# Patient Record
Sex: FEMALE | Race: WHITE | NOT HISPANIC OR LATINO | Employment: OTHER | URBAN - NONMETROPOLITAN AREA
[De-identification: names, ages, dates, MRNs, and addresses within clinical notes are randomized per-mention and may not be internally consistent; named-entity substitution may affect disease eponyms.]

---

## 2017-02-24 ENCOUNTER — OFFICE VISIT (OUTPATIENT)
Dept: CARDIOLOGY | Facility: CLINIC | Age: 72
End: 2017-02-24

## 2017-02-24 DIAGNOSIS — I42.0 DILATED CARDIOMYOPATHY (HCC): Primary | ICD-10-CM

## 2017-02-24 PROCEDURE — 93289 INTERROG DEVICE EVAL HEART: CPT | Performed by: INTERNAL MEDICINE

## 2017-06-23 ENCOUNTER — OFFICE VISIT (OUTPATIENT)
Dept: CARDIOLOGY | Facility: CLINIC | Age: 72
End: 2017-06-23

## 2017-06-23 VITALS
HEIGHT: 69 IN | DIASTOLIC BLOOD PRESSURE: 67 MMHG | HEART RATE: 77 BPM | OXYGEN SATURATION: 98 % | SYSTOLIC BLOOD PRESSURE: 94 MMHG | BODY MASS INDEX: 21.92 KG/M2 | WEIGHT: 148 LBS

## 2017-06-23 DIAGNOSIS — R06.02 SHORTNESS OF BREATH: ICD-10-CM

## 2017-06-23 DIAGNOSIS — I48.20 CHRONIC ATRIAL FIBRILLATION (HCC): ICD-10-CM

## 2017-06-23 DIAGNOSIS — I42.0 DILATED CARDIOMYOPATHY (HCC): Primary | ICD-10-CM

## 2017-06-23 PROCEDURE — 99213 OFFICE O/P EST LOW 20 MIN: CPT | Performed by: PHYSICIAN ASSISTANT

## 2017-06-23 PROCEDURE — 93289 INTERROG DEVICE EVAL HEART: CPT | Performed by: INTERNAL MEDICINE

## 2017-06-23 RX ORDER — RANITIDINE 150 MG/1
150 TABLET ORAL 2 TIMES DAILY
COMMUNITY
Start: 2017-06-13 | End: 2018-01-26 | Stop reason: ALTCHOICE

## 2017-06-23 NOTE — PROGRESS NOTES
Problem list     Subjective   Mag Wu is a 71 y.o. female     Chief Complaint   Patient presents with   • Follow-up     presents as a follow up   • Pacemaker Check   Problem List:      1. Minor nonobstructive disease by previous caths per patient's report, inadequate data.  2. Dilated cardiomyopathy status post AICD implantation.  3. Atrial fibrillation on Coumadin  4. Hyperlipidemia      5. Sjogren's Syndrome    HPI  The patient presents back today for routine evaluation.  Since her last appointment here, she has done well from cardiac standpoint.  Her main complaint today is of dizziness.  Description, this is mostly vertigo.  She does have mild orthostasis at times however.  This is very minimal.  The patient denies chest pain.  She has stable dyspnea.  She relates to no PND or orthopnea.  She denies symptoms of sustained dysrhythmias, although she does have brief rhythm disturbance at times.  This is nonproblematic.  The patient did have an ICD interrogation today which indicates stable device function.  She has no further complaints otherwise and feels that she is doing well.    Current Outpatient Prescriptions   Medication Sig Dispense Refill   • butalbital-acetaminophen-caffeine (FIORICET, ESGIC) -40 MG per tablet Take  by mouth 3 (Three) Times a Day.     • ESTRACE VAGINAL 0.1 MG/GM vaginal cream      • folic acid (FOLVITE) 400 MCG tablet Take  by mouth Daily.     • furosemide (LASIX) 40 MG tablet Take  by mouth.     • levothyroxine (SYNTHROID, LEVOTHROID) 25 MCG tablet Take  by mouth Daily.     • lisinopril (PRINIVIL,ZESTRIL) 5 MG tablet Take  by mouth.     • metoprolol tartrate (LOPRESSOR) 50 MG tablet Take  by mouth.     • Multiple Minerals-Vitamins (CALCIUM CITRATE +) tablet Take  by mouth.     • Multiple Vitamin (MULTIVITAMINS PO) Take  by mouth Daily.     • potassium chloride (MICRO-K) 10 MEQ CR capsule Take  by mouth.     • primidone (MYSOLINE) 50 MG tablet Take  by mouth.     •  "Probiotic Product (PROBIOTIC DAILY PO) Take  by mouth.     • raNITIdine (ZANTAC) 150 MG tablet Take 150 mg by mouth 2 (Two) Times a Day.     • rizatriptan (MAXALT) 10 MG tablet Take  by mouth.     • vitamin B-12 (CYANOCOBALAMIN) 1000 MCG tablet Take  by mouth 2 (Two) Times a Day.     • warfarin (COUMADIN) 10 MG tablet Take  by mouth Daily.       No current facility-administered medications for this visit.        Review of patient's allergies indicates no known allergies.    Past Medical History:   Diagnosis Date   • Atherosclerotic heart disease of native coronary artery without angina pectoris    • Atrial fibrillation 8/25/2016   • Dilated cardiomyopathy    • Hyperlipidemia 8/25/2016   • Hypothyroidism    • Sjogren's disease        Social History     Social History   • Marital status:      Spouse name: N/A   • Number of children: N/A   • Years of education: N/A     Occupational History   • Not on file.     Social History Main Topics   • Smoking status: Never Smoker   • Smokeless tobacco: Not on file   • Alcohol use No   • Drug use: No   • Sexual activity: Not on file     Other Topics Concern   • Not on file     Social History Narrative       Family History   Problem Relation Age of Onset   • Other Father      coronary arteriosclerosis   • Other Brother      cardiac pacemaker       Review of Systems   Constitutional: Positive for fatigue.   Eyes: Positive for visual disturbance.   Respiratory: Negative.    Cardiovascular: Negative.    Gastrointestinal: Negative.    Endocrine: Negative.    Genitourinary: Positive for difficulty urinating.   Musculoskeletal: Positive for arthralgias.   Skin: Negative.    Allergic/Immunologic: Negative.    Neurological: Positive for headaches.   Hematological: Negative.    Psychiatric/Behavioral: The patient is nervous/anxious.        Objective   BP 94/67 (BP Location: Left arm, Patient Position: Sitting)  Pulse 77  Ht 69\" (175.3 cm)  Wt 148 lb (67.1 kg)  SpO2 98%  BMI " 21.86 kg/m2  Lab Results (most recent)     None        Physical Exam   Constitutional: She is oriented to person, place, and time. She appears well-developed and well-nourished. No distress.   HENT:   Head: Normocephalic and atraumatic.   Eyes: Conjunctivae and EOM are normal. Pupils are equal, round, and reactive to light.   Neck: Normal range of motion. Neck supple. No JVD present. No tracheal deviation present.   Cardiovascular: Normal rate, regular rhythm, normal heart sounds and intact distal pulses.    Pulmonary/Chest: Effort normal and breath sounds normal.   Abdominal: Soft. Bowel sounds are normal. She exhibits no distension and no mass. There is no tenderness. There is no rebound and no guarding.   Musculoskeletal: Normal range of motion. She exhibits no edema, tenderness or deformity.   Neurological: She is alert and oriented to person, place, and time.   Skin: Skin is warm and dry. No rash noted. No erythema. No pallor.   Psychiatric: She has a normal mood and affect. Her behavior is normal. Judgment and thought content normal.   Nursing note and vitals reviewed.        Procedure   Procedures       Assessment/Plan      Diagnosis Plan   1. Dilated cardiomyopathy  Adult Transthoracic Echo Complete   2. Chronic atrial fibrillation  Adult Transthoracic Echo Complete   3. Shortness of breath  Adult Transthoracic Echo Complete     ICD interrogation today indicates stable function.  We will continue interrogations every 3 months to the clinic.  I will make no changes in cardiac related medications.  She has hypotensive today, but reports this is her normal and nonproblematic.  I will make no changes otherwise in that regard.  For any complications, she will call to us.  Otherwise, I would like to repeat an echocardiogram to reevaluate systolic function.  If stable, I will see her back in 6 months.

## 2017-09-22 ENCOUNTER — OFFICE VISIT (OUTPATIENT)
Dept: CARDIOLOGY | Facility: CLINIC | Age: 72
End: 2017-09-22

## 2017-09-22 DIAGNOSIS — I42.0 DILATED CARDIOMYOPATHY (HCC): Primary | ICD-10-CM

## 2017-09-22 PROCEDURE — 93289 INTERROG DEVICE EVAL HEART: CPT | Performed by: INTERNAL MEDICINE

## 2017-10-18 ENCOUNTER — TELEPHONE (OUTPATIENT)
Dept: CARDIOLOGY | Facility: CLINIC | Age: 72
End: 2017-10-18

## 2017-10-18 NOTE — TELEPHONE ENCOUNTER
Patient came by office regarding echo results that was done at [facility] in 06/2017. Patient wanted to know results ; echo form 06/2017 and the previous echo from 03/2016 was printed and reviewed with pt. Patient was notifieid echo from 06/2017 results were better than previous, slight regurgitation still shown but EF was improved. Patient was notified to continue current medication regimen keep all apts as scheduled. -; MARIANO

## 2018-01-26 ENCOUNTER — OFFICE VISIT (OUTPATIENT)
Dept: CARDIOLOGY | Facility: CLINIC | Age: 73
End: 2018-01-26

## 2018-01-26 VITALS
HEIGHT: 69 IN | WEIGHT: 149 LBS | OXYGEN SATURATION: 97 % | HEART RATE: 80 BPM | BODY MASS INDEX: 22.07 KG/M2 | SYSTOLIC BLOOD PRESSURE: 102 MMHG | DIASTOLIC BLOOD PRESSURE: 66 MMHG

## 2018-01-26 DIAGNOSIS — I48.20 CHRONIC ATRIAL FIBRILLATION (HCC): Primary | ICD-10-CM

## 2018-01-26 DIAGNOSIS — I42.0 DILATED CARDIOMYOPATHY (HCC): Primary | ICD-10-CM

## 2018-01-26 DIAGNOSIS — R06.02 SHORTNESS OF BREATH: ICD-10-CM

## 2018-01-26 DIAGNOSIS — I42.0 DILATED CARDIOMYOPATHY (HCC): ICD-10-CM

## 2018-01-26 PROCEDURE — 99213 OFFICE O/P EST LOW 20 MIN: CPT | Performed by: PHYSICIAN ASSISTANT

## 2018-01-26 PROCEDURE — 93289 INTERROG DEVICE EVAL HEART: CPT | Performed by: INTERNAL MEDICINE

## 2018-01-26 RX ORDER — OMEPRAZOLE 20 MG/1
20 CAPSULE, DELAYED RELEASE ORAL DAILY
COMMUNITY

## 2018-01-26 RX ORDER — NITROFURANTOIN MACROCRYSTALS 100 MG/1
100 CAPSULE ORAL DAILY
COMMUNITY
Start: 2018-01-25 | End: 2019-01-25

## 2018-01-26 NOTE — PROGRESS NOTES
Problem list     Subjective   Mag Wu is a 72 y.o. female     Chief Complaint   Patient presents with   • Follow-up     patient appears in office today for follow up with PPM check and testing results    • Cardiomyopathy     Problem List:      1. Minor nonobstructive disease by previous caths per patient's report, inadequate data.  2. Dilated cardiomyopathy status post AICD implantation.  3. Atrial fibrillation on Coumadin  4. Hyperlipidemia      5. Sjogren's Syndrome  HPI  The patient present back in today for routine evaluation and follow-up.  Since last appointment here, she has continued to do reasonably well.  She denies chest pain.  She has stable dyspnea.  She relates to no rhythm disturbance issues although interrogations suggests that she is in chronic A. fib.  The patient has no dizziness or syncope.  ICD interrogation today indicates stable function.  She did have an echo after last appointment to evaluate systolic function.  She has history of dilated cardiomyopathy.  Echocardiogram performed at [facility] suggested an EF of 55-60% with mild to moderate MR and AI.  Findings were unremarkable otherwise.  We did review this with the patient today.  She has no further complaints otherwise.    Current Outpatient Prescriptions   Medication Sig Dispense Refill   • butalbital-acetaminophen-caffeine (FIORICET, ESGIC) -40 MG per tablet Take 1 tablet by mouth 3 (Three) Times a Day.     • ESTRACE VAGINAL 0.1 MG/GM vaginal cream Insert 2 g into the vagina Daily.     • folic acid (FOLVITE) 400 MCG tablet Take 400 mcg by mouth Daily.     • levothyroxine (SYNTHROID, LEVOTHROID) 25 MCG tablet Take 25 mcg by mouth Daily.     • lisinopril (PRINIVIL,ZESTRIL) 5 MG tablet Take 5 mg by mouth Daily.     • metoprolol tartrate (LOPRESSOR) 50 MG tablet Take 50 mg by mouth Daily.     • Multiple Minerals-Vitamins (CALCIUM CITRATE +) tablet Take  by mouth.     • Multiple Vitamin (MULTIVITAMINS PO) Take   by mouth Daily.     • nitrofurantoin (MACRODANTIN) 100 MG capsule Take 100 mg by mouth Daily.     • omeprazole (priLOSEC) 20 MG capsule Take 20 mg by mouth Daily.     • primidone (MYSOLINE) 50 MG tablet Take 50 mg by mouth Every Night.     • Probiotic Product (PROBIOTIC DAILY PO) Take  by mouth.     • rizatriptan (MAXALT) 10 MG tablet Take 10 mg by mouth 1 (One) Time As Needed for Migraine.     • vitamin B-12 (CYANOCOBALAMIN) 1000 MCG tablet Take 1,000 mcg by mouth 2 (Two) Times a Day.     • warfarin (COUMADIN) 10 MG tablet Take 10 mg by mouth Daily.       No current facility-administered medications for this visit.        Review of patient's allergies indicates no known allergies.    Past Medical History:   Diagnosis Date   • Anemia    • Atherosclerotic heart disease of native coronary artery without angina pectoris    • Atrial fibrillation 8/25/2016   • Dilated cardiomyopathy    • Hyperlipidemia 8/25/2016   • Hypothyroidism    • Sjogren's disease        Social History     Social History   • Marital status:      Spouse name: N/A   • Number of children: N/A   • Years of education: N/A     Occupational History   • Not on file.     Social History Main Topics   • Smoking status: Never Smoker   • Smokeless tobacco: Never Used   • Alcohol use No   • Drug use: No   • Sexual activity: Defer     Other Topics Concern   • Not on file     Social History Narrative       Family History   Problem Relation Age of Onset   • Other Father      coronary arteriosclerosis   • Other Brother      cardiac pacemaker       Review of Systems   Constitutional: Negative.  Negative for fatigue.   HENT: Negative for congestion, rhinorrhea, sneezing and sore throat.    Eyes: Positive for visual disturbance (wears glasses daily).   Respiratory: Negative.  Negative for apnea, cough, chest tightness, shortness of breath (denies SOA) and wheezing.    Cardiovascular: Negative.  Negative for chest pain (denies CP), palpitations (denies  "palpitations) and leg swelling.   Gastrointestinal: Negative.  Negative for abdominal distention, abdominal pain, nausea and vomiting.   Endocrine: Negative.  Negative for cold intolerance, heat intolerance, polyphagia and polyuria.   Genitourinary: Negative.  Negative for difficulty urinating, frequency and urgency.   Musculoskeletal: Positive for arthralgias (spine/neck), back pain (due to arthritis) and neck pain (due to arthritis). Negative for myalgias and neck stiffness.   Skin: Negative.  Negative for rash and wound.   Allergic/Immunologic: Positive for food allergies (gluten intolerant). Negative for environmental allergies.   Neurological: Positive for headaches (occasional H/A; H/O migraines). Negative for dizziness, weakness and light-headedness.   Hematological: Bruises/bleeds easily (bruises and bleeds easily).   Psychiatric/Behavioral: Negative.  Negative for agitation, confusion and sleep disturbance (denies waking up smothering/SOA). The patient is not nervous/anxious.        Objective   Vitals:    01/26/18 1007   BP: 102/66   BP Location: Left arm   Patient Position: Sitting   Pulse: 80   SpO2: 97%   Weight: 67.6 kg (149 lb)   Height: 175.3 cm (69\")      /66 (BP Location: Left arm, Patient Position: Sitting)  Pulse 80  Ht 175.3 cm (69\")  Wt 67.6 kg (149 lb)  SpO2 97%  BMI 22 kg/m2   Lab Results (most recent)     None        Physical Exam   Constitutional: She is oriented to person, place, and time. She appears well-developed and well-nourished. No distress.   HENT:   Head: Normocephalic and atraumatic.   Eyes: Conjunctivae and EOM are normal. Pupils are equal, round, and reactive to light.   Neck: Normal range of motion. Neck supple. No JVD present. No tracheal deviation present.   Cardiovascular: Normal rate and intact distal pulses.  An irregularly irregular rhythm present.   Murmur heard.   Systolic (LLSB) murmur is present with a grade of 1/6   Pulmonary/Chest: Effort normal and " breath sounds normal.   Abdominal: Soft. Bowel sounds are normal. She exhibits no distension and no mass. There is no tenderness. There is no rebound and no guarding.   Musculoskeletal: Normal range of motion. She exhibits no edema, tenderness or deformity.   Neurological: She is alert and oriented to person, place, and time.   Skin: Skin is warm and dry. No rash noted. No erythema. No pallor.   Psychiatric: She has a normal mood and affect. Her behavior is normal. Judgment and thought content normal.   Nursing note and vitals reviewed.        Procedure     ECG 12 Lead  Date/Time: 1/26/2018 10:14 AM  Performed by: VANNESA BARTLETT  Authorized by: VANNESA BARTLETT   Comments: Atrial fib    Ventricular pacing without acute changes noted.               Assessment/Plan      Diagnosis Plan   1. Chronic atrial fibrillation  ECG 12 Lead   2. Dilated cardiomyopathy     3. Shortness of breath         The patient is stable from cardiac standpoint.  ICD interrogations today suggest normal device function.  We did review results of echocardiogram which suggests normal systolic function now and mild to moderate MR and AI.  Findings were felt stable to improved.  I feel nothing further is indicated.  I will continue medications.  For any complications, she will called us.  Otherwise, we will see her back in 3 months for repeat interrogation.  We will see her formally in 6 months.               Electronically signed by:

## 2018-04-27 ENCOUNTER — OFFICE VISIT (OUTPATIENT)
Dept: CARDIOLOGY | Facility: CLINIC | Age: 73
End: 2018-04-27

## 2018-04-27 DIAGNOSIS — I42.0 DILATED CARDIOMYOPATHY (HCC): Primary | ICD-10-CM

## 2018-04-27 PROCEDURE — 93289 INTERROG DEVICE EVAL HEART: CPT | Performed by: INTERNAL MEDICINE

## 2018-07-27 ENCOUNTER — OFFICE VISIT (OUTPATIENT)
Dept: CARDIOLOGY | Facility: CLINIC | Age: 73
End: 2018-07-27

## 2018-07-27 VITALS
HEART RATE: 77 BPM | SYSTOLIC BLOOD PRESSURE: 106 MMHG | OXYGEN SATURATION: 98 % | DIASTOLIC BLOOD PRESSURE: 64 MMHG | HEIGHT: 69 IN | WEIGHT: 146.8 LBS | BODY MASS INDEX: 21.74 KG/M2

## 2018-07-27 DIAGNOSIS — I42.0 DILATED CARDIOMYOPATHY (HCC): Primary | ICD-10-CM

## 2018-07-27 DIAGNOSIS — I48.20 CHRONIC ATRIAL FIBRILLATION (HCC): Primary | ICD-10-CM

## 2018-07-27 DIAGNOSIS — I42.0 DILATED CARDIOMYOPATHY (HCC): ICD-10-CM

## 2018-07-27 DIAGNOSIS — R06.02 SHORTNESS OF BREATH: ICD-10-CM

## 2018-07-27 PROCEDURE — 99213 OFFICE O/P EST LOW 20 MIN: CPT | Performed by: PHYSICIAN ASSISTANT

## 2018-07-27 PROCEDURE — 93289 INTERROG DEVICE EVAL HEART: CPT | Performed by: INTERNAL MEDICINE

## 2018-07-27 RX ORDER — SUMATRIPTAN 50 MG/1
50 TABLET, FILM COATED ORAL AS NEEDED
COMMUNITY
Start: 2018-06-21

## 2018-07-27 NOTE — PROGRESS NOTES
Problem list     Subjective   Mag Wu is a 72 y.o. female     Chief Complaint   Patient presents with   • Follow-up     patient appears in office today for 6 month follow up    • Atrial Fibrillation   Problem List:      1. Minor nonobstructive disease by previous caths per patient's report, inadequate data.  2. Dilated cardiomyopathy status post AICD implantation.  3. Atrial fibrillation on Coumadin  4. Hyperlipidemia      5. Sjogren's Syndrome    HPI  The patient presents in today for routine evaluation and follow-up.  She did have an ICD interrogation today which suggests stable function.  The patient is done well since her last appointment.  She relates to stable dyspnea.  She has no chest pain.  She denies PND or orthopnea.  Exercise capacity is remained stable.  Blood pressures have been normal as well.  She is tolerating current medications without complication.  She has routine labs to check PTT/INR is.  She reports those as therapeutic as of recent.  She has no bleeding complications with Coumadin.  She has no further complaints otherwise at this time.    Current Outpatient Prescriptions   Medication Sig Dispense Refill   • butalbital-acetaminophen-caffeine (FIORICET, ESGIC) -40 MG per tablet Take 1 tablet by mouth 3 (Three) Times a Day.     • ESTRACE VAGINAL 0.1 MG/GM vaginal cream Insert 2 g into the vagina Daily.     • folic acid (FOLVITE) 400 MCG tablet Take 400 mcg by mouth Daily.     • levothyroxine (SYNTHROID, LEVOTHROID) 25 MCG tablet Take 25 mcg by mouth Daily.     • lisinopril (PRINIVIL,ZESTRIL) 5 MG tablet Take 5 mg by mouth Daily.     • metoprolol tartrate (LOPRESSOR) 50 MG tablet Take 50 mg by mouth Daily.     • Multiple Minerals-Vitamins (CALCIUM CITRATE +) tablet Take  by mouth.     • Multiple Vitamin (MULTIVITAMINS PO) Take  by mouth Daily.     • nitrofurantoin (MACRODANTIN) 100 MG capsule Take 100 mg by mouth Daily.     • omeprazole (priLOSEC) 20 MG capsule Take 20 mg by  mouth Daily.     • primidone (MYSOLINE) 50 MG tablet Take 50 mg by mouth Every Night.     • Probiotic Product (PROBIOTIC DAILY PO) Take  by mouth.     • SUMAtriptan (IMITREX) 50 MG tablet Take 50 mg by mouth As Needed.     • vitamin B-12 (CYANOCOBALAMIN) 1000 MCG tablet Take 1,000 mcg by mouth 2 (Two) Times a Day.     • warfarin (COUMADIN) 10 MG tablet Take 10 mg by mouth Daily.       No current facility-administered medications for this visit.        Patient has no known allergies.    Past Medical History:   Diagnosis Date   • Anemia    • Atherosclerotic heart disease of native coronary artery without angina pectoris    • Atrial fibrillation (CMS/HCC) 8/25/2016   • Dilated cardiomyopathy (CMS/HCC)    • Hyperlipidemia 8/25/2016   • Hypothyroidism    • Sjogren's disease (CMS/HCC)        Social History     Social History   • Marital status:      Spouse name: N/A   • Number of children: N/A   • Years of education: N/A     Occupational History   • Not on file.     Social History Main Topics   • Smoking status: Never Smoker   • Smokeless tobacco: Never Used   • Alcohol use No   • Drug use: No   • Sexual activity: Defer     Other Topics Concern   • Not on file     Social History Narrative   • No narrative on file       Family History   Problem Relation Age of Onset   • Other Father         coronary arteriosclerosis   • Other Brother         cardiac pacemaker       Review of Systems   Constitutional: Negative.  Negative for fatigue.   HENT: Negative for congestion, rhinorrhea, sneezing and sore throat.    Eyes: Positive for visual disturbance (wears glasses daily).   Respiratory: Positive for shortness of breath (SOA with exertion ). Negative for apnea, cough, chest tightness and wheezing.    Cardiovascular: Positive for palpitations (occasional palpitations). Negative for chest pain (denies CP) and leg swelling.   Gastrointestinal: Negative.  Negative for abdominal distention, abdominal pain, nausea and vomiting.  "  Endocrine: Negative.  Negative for cold intolerance, heat intolerance and polyphagia.   Genitourinary: Negative.  Negative for difficulty urinating, frequency and urgency.   Musculoskeletal: Positive for arthralgias (joints) and back pain (back pain from spurs on spine). Negative for myalgias, neck pain and neck stiffness.   Skin: Negative.  Negative for rash and wound.   Allergic/Immunologic: Positive for food allergies (gluten intolerance). Negative for environmental allergies.   Neurological: Positive for headaches (frequent H/A's). Negative for dizziness, syncope, weakness and light-headedness.   Hematological: Bruises/bleeds easily (bruises and bleeds easily).   Psychiatric/Behavioral: Negative for agitation, confusion and sleep disturbance (denies waking up smothering/SOA). The patient is nervous/anxious (easily nervous/anxious).        Objective   Vitals:    07/27/18 0909   BP: 106/64   BP Location: Left arm   Patient Position: Sitting   Pulse: 77   SpO2: 98%   Weight: 66.6 kg (146 lb 12.8 oz)   Height: 175.3 cm (69\")      /64 (BP Location: Left arm, Patient Position: Sitting)   Pulse 77   Ht 175.3 cm (69\")   Wt 66.6 kg (146 lb 12.8 oz)   SpO2 98%   BMI 21.68 kg/m²    Lab Results (most recent)     None        Physical Exam   Constitutional: She is oriented to person, place, and time. She appears well-developed and well-nourished. No distress.   HENT:   Head: Normocephalic and atraumatic.   Eyes: Pupils are equal, round, and reactive to light. Conjunctivae and EOM are normal.   Neck: Normal range of motion. Neck supple. No JVD present. No tracheal deviation present.   Cardiovascular: Normal rate and intact distal pulses.  An irregularly irregular rhythm present.   Murmur heard.   Systolic (LLSB) murmur is present with a grade of 1/6   Pulmonary/Chest: Effort normal and breath sounds normal.   Abdominal: Soft. Bowel sounds are normal. She exhibits no distension and no mass. There is no tenderness. " There is no rebound and no guarding.   Musculoskeletal: Normal range of motion. She exhibits no edema, tenderness or deformity.   Neurological: She is alert and oriented to person, place, and time.   Skin: Skin is warm and dry. No rash noted. No erythema. No pallor.   Psychiatric: She has a normal mood and affect. Her behavior is normal. Judgment and thought content normal.   Nursing note and vitals reviewed.        Procedure     ECG 12 Lead  Date/Time: 7/27/2018 9:12 AM  Performed by: VANNESA BARTLETT  Authorized by: VANNESA BARTLETT   Comments: Atrial fib    Ventricular pacing without acute changes noted.               Assessment/Plan      Diagnosis Plan   1. Chronic atrial fibrillation (CMS/HCC)  ECG 12 Lead   2. Shortness of breath     3. Dilated cardiomyopathy (CMS/HCC)       ICD interrogations suggests normal function.  We will repeat that in 3 months.  The patient is on appropriate medications for which I will not change.  She is doing well from cardiovascular standpoint at this time.  We can see her back in 6 months through the clinic formally.  She will call for any complications prior to that.           Patient's Body mass index is 21.68 kg/m². BMI is within normal parameters. No follow-up required.             Electronically signed by:

## 2018-10-26 ENCOUNTER — OFFICE VISIT (OUTPATIENT)
Dept: CARDIOLOGY | Facility: CLINIC | Age: 73
End: 2018-10-26

## 2018-10-26 DIAGNOSIS — I42.0 DILATED CARDIOMYOPATHY (HCC): Primary | ICD-10-CM

## 2018-10-26 PROCEDURE — 93289 INTERROG DEVICE EVAL HEART: CPT | Performed by: INTERNAL MEDICINE

## 2019-01-25 ENCOUNTER — OFFICE VISIT (OUTPATIENT)
Dept: CARDIOLOGY | Facility: CLINIC | Age: 74
End: 2019-01-25

## 2019-01-25 VITALS
HEART RATE: 82 BPM | BODY MASS INDEX: 21.92 KG/M2 | OXYGEN SATURATION: 98 % | DIASTOLIC BLOOD PRESSURE: 63 MMHG | WEIGHT: 148 LBS | SYSTOLIC BLOOD PRESSURE: 102 MMHG | HEIGHT: 69 IN

## 2019-01-25 DIAGNOSIS — R06.02 SHORTNESS OF BREATH: ICD-10-CM

## 2019-01-25 DIAGNOSIS — I25.10 CORONARY ARTERY DISEASE DUE TO CALCIFIED CORONARY LESION: Primary | ICD-10-CM

## 2019-01-25 DIAGNOSIS — I25.84 CORONARY ARTERY DISEASE DUE TO CALCIFIED CORONARY LESION: Primary | ICD-10-CM

## 2019-01-25 DIAGNOSIS — I48.91 ATRIAL FIBRILLATION, UNSPECIFIED TYPE (HCC): ICD-10-CM

## 2019-01-25 DIAGNOSIS — I42.0 DILATED CARDIOMYOPATHY (HCC): Primary | ICD-10-CM

## 2019-01-25 PROCEDURE — 99213 OFFICE O/P EST LOW 20 MIN: CPT | Performed by: PHYSICIAN ASSISTANT

## 2019-01-25 PROCEDURE — 93000 ELECTROCARDIOGRAM COMPLETE: CPT | Performed by: PHYSICIAN ASSISTANT

## 2019-01-25 PROCEDURE — 93289 INTERROG DEVICE EVAL HEART: CPT | Performed by: INTERNAL MEDICINE

## 2019-01-25 NOTE — PROGRESS NOTES
Problem list     Subjective   Mag Wu is a 73 y.o. female     Chief Complaint   Patient presents with   • Coronary Artery Disease     Here for 6 mo. f/u   • Atrial Fibrillation   • Cardiomyopathy   • Hyperlipidemia   • Hypothyroidism   Problem List:      1. Minor nonobstructive disease by previous caths per patient's report, inadequate data.  2. Dilated cardiomyopathy status post AICD implantation.  3. Atrial fibrillation on Coumadin  4. Hyperlipidemia      5. Sjogren's Syndrome       HPI  The patient presents in today for routine evaluation and follow-up.  Since last evaluation here, the patient is continued to do fairly well from cardiovascular standpoint.  She denies any current chest discomfort.  She has stable dyspnea.  She reports intermittent episodes of palpitations and tachycardia which she recognizes Manzanares's A. fib.  These are very brief and nonproblematic.  These occur very infrequently.  She has no failure symptoms.  She denies claudication or vascular disease signs/symptoms otherwise.  She does have largely normal blood pressures when checked at home.  Of note, she did have a device interrogation today which indicated normal function of her bi-V ICD.    Current Outpatient Medications   Medication Sig Dispense Refill   • butalbital-acetaminophen-caffeine (FIORICET, ESGIC) -40 MG per tablet Take 1 tablet by mouth 3 (Three) Times a Day.     • ESTRACE VAGINAL 0.1 MG/GM vaginal cream Insert 2 g into the vagina Daily.     • folic acid (FOLVITE) 400 MCG tablet Take 400 mcg by mouth Daily.     • levothyroxine (SYNTHROID, LEVOTHROID) 25 MCG tablet Take 25 mcg by mouth Daily.     • lisinopril (PRINIVIL,ZESTRIL) 5 MG tablet Take 5 mg by mouth Daily.     • metoprolol tartrate (LOPRESSOR) 50 MG tablet Take 50 mg by mouth Daily.     • Multiple Minerals-Vitamins (CALCIUM CITRATE +) tablet Take  by mouth.     • Multiple Vitamin (MULTIVITAMINS PO) Take  by mouth Daily.     • omeprazole (priLOSEC) 20 MG  capsule Take 20 mg by mouth Daily.     • primidone (MYSOLINE) 50 MG tablet Take 50 mg by mouth Every Night.     • Probiotic Product (PROBIOTIC DAILY PO) Take  by mouth.     • SUMAtriptan (IMITREX) 50 MG tablet Take 50 mg by mouth As Needed.     • vitamin B-12 (CYANOCOBALAMIN) 1000 MCG tablet Take 1,000 mcg by mouth 2 (Two) Times a Day.     • warfarin (COUMADIN) 10 MG tablet Take 10 mg by mouth Daily.       No current facility-administered medications for this visit.        Patient has no known allergies.    Past Medical History:   Diagnosis Date   • Anemia    • Atherosclerotic heart disease of native coronary artery without angina pectoris    • Atrial fibrillation (CMS/HCC) 8/25/2016   • Dilated cardiomyopathy (CMS/HCC)    • Hyperlipidemia 8/25/2016   • Hypothyroidism    • Sjogren's disease (CMS/[facility])        Social History     Socioeconomic History   • Marital status:      Spouse name: Not on file   • Number of children: Not on file   • Years of education: Not on file   • Highest education level: Not on file   Social Needs   • Financial resource strain: Not on file   • Food insecurity - worry: Not on file   • Food insecurity - inability: Not on file   • Transportation needs - medical: Not on file   • Transportation needs - non-medical: Not on file   Occupational History   • Not on file   Tobacco Use   • Smoking status: Never Smoker   • Smokeless tobacco: Never Used   Substance and Sexual Activity   • Alcohol use: No   • Drug use: No   • Sexual activity: Defer   Other Topics Concern   • Not on file   Social History Narrative   • Not on file       Family History   Problem Relation Age of Onset   • Other Father         coronary arteriosclerosis   • Other Brother         cardiac pacemaker       Review of Systems   Constitutional: Negative.    HENT: Positive for nosebleeds (winter months).         Sinus issues   Eyes: Positive for visual disturbance (wears glasses).   Respiratory: Positive for shortness of breath  "(mildly).    Cardiovascular: Positive for palpitations (occas. HX a-fib). Negative for chest pain and leg swelling.   Gastrointestinal: Negative.    Endocrine: Negative.    Genitourinary: Negative.    Musculoskeletal: Positive for arthralgias and myalgias.   Skin: Negative.    Allergic/Immunologic: Positive for food allergies (gluten).   Neurological:        Denies stroke like sx's   Hematological: Bruises/bleeds easily.   Psychiatric/Behavioral: Negative.        Objective   Vitals:    01/25/19 0850   BP: 102/63   BP Location: Left arm   Patient Position: Sitting   Cuff Size: Adult   Pulse: 82   SpO2: 98%   Weight: 67.1 kg (148 lb)   Height: 175.3 cm (69.02\")      /63 (BP Location: Left arm, Patient Position: Sitting, Cuff Size: Adult)   Pulse 82   Ht 175.3 cm (69.02\")   Wt 67.1 kg (148 lb)   SpO2 98%   BMI 21.85 kg/m²    Lab Results (most recent)     None        Physical Exam   Constitutional: She is oriented to person, place, and time. She appears well-developed and well-nourished. No distress.   HENT:   Head: Normocephalic and atraumatic.   Eyes: Conjunctivae and EOM are normal. Pupils are equal, round, and reactive to light.   Neck: Normal range of motion. Neck supple. No JVD present. No tracheal deviation present.   Cardiovascular: Normal rate and intact distal pulses. An irregularly irregular rhythm present.   Murmur heard.   Systolic (LLSB) murmur is present with a grade of 1/6.  Pulmonary/Chest: Effort normal and breath sounds normal.   Abdominal: Soft. Bowel sounds are normal. She exhibits no distension and no mass. There is no tenderness. There is no rebound and no guarding.   Musculoskeletal: Normal range of motion. She exhibits no edema, tenderness or deformity.   Neurological: She is alert and oriented to person, place, and time.   Skin: Skin is warm and dry. No rash noted. No erythema. No pallor.   Psychiatric: She has a normal mood and affect. Her behavior is normal. Judgment and thought " content normal.   Nursing note and vitals reviewed.        Procedure     ECG 12 Lead  Date/Time: 1/25/2019 9:10 AM  Performed by: Dong Barr PA  Authorized by: Dong Barr PA   Comments: Ventricular pacing without acute changes noted.               Assessment/Plan      Diagnosis Plan   1. Coronary artery disease due to calcified coronary lesion  ECG 12 Lead    Adult Transthoracic Echo Complete W/ Cont if Necessary Per Protocol   2. Atrial fibrillation, unspecified type (CMS/HCC)  ECG 12 Lead    Adult Transthoracic Echo Complete W/ Cont if Necessary Per Protocol   3. Shortness of breath  Adult Transthoracic Echo Complete W/ Cont if Necessary Per Protocol     The patient is stable at this time from cardiovascular standpoint.  I would like to repeat an echo just to evaluate systolic function given history of dilated cardiomyopathy.  Her last echo from 2017 suggested preserved systolic function, of note.  Otherwise, I will make no changes in medications.  She is scheduled for laboratory evaluation today with her primary care provider.  I have asked that she for a copy of her labs to the clinic.  If her echo findings are stable, we will continue ICD checks every 3 months through the clinic and routine follow-ups every 6 months.                Patient's Body mass index is 21.85 kg/m². BMI is within normal parameters. No follow-up required..             Electronically signed by:

## 2019-04-26 ENCOUNTER — OFFICE VISIT (OUTPATIENT)
Dept: CARDIOLOGY | Facility: CLINIC | Age: 74
End: 2019-04-26

## 2019-04-26 DIAGNOSIS — I42.0 DILATED CARDIOMYOPATHY (HCC): Primary | ICD-10-CM

## 2019-04-26 PROCEDURE — 93289 INTERROG DEVICE EVAL HEART: CPT | Performed by: INTERNAL MEDICINE

## 2019-05-06 PROCEDURE — 93306 TTE W/DOPPLER COMPLETE: CPT | Performed by: INTERNAL MEDICINE

## 2019-05-08 ENCOUNTER — OUTSIDE FACILITY SERVICE (OUTPATIENT)
Dept: CARDIOLOGY | Facility: CLINIC | Age: 74
End: 2019-05-08

## 2019-07-26 ENCOUNTER — OFFICE VISIT (OUTPATIENT)
Dept: CARDIOLOGY | Facility: CLINIC | Age: 74
End: 2019-07-26

## 2019-07-26 VITALS
OXYGEN SATURATION: 97 % | SYSTOLIC BLOOD PRESSURE: 121 MMHG | HEIGHT: 69 IN | BODY MASS INDEX: 21.66 KG/M2 | HEART RATE: 70 BPM | DIASTOLIC BLOOD PRESSURE: 75 MMHG | WEIGHT: 146.2 LBS

## 2019-07-26 DIAGNOSIS — Z95.810 ICD (IMPLANTABLE CARDIOVERTER-DEFIBRILLATOR) IN PLACE: ICD-10-CM

## 2019-07-26 DIAGNOSIS — I42.0 DILATED CARDIOMYOPATHY (HCC): Primary | ICD-10-CM

## 2019-07-26 DIAGNOSIS — R06.02 SHORTNESS OF BREATH: ICD-10-CM

## 2019-07-26 DIAGNOSIS — I48.91 ATRIAL FIBRILLATION, UNSPECIFIED TYPE (HCC): ICD-10-CM

## 2019-07-26 PROCEDURE — 93283 PRGRMG EVAL IMPLANTABLE DFB: CPT | Performed by: INTERNAL MEDICINE

## 2019-07-26 PROCEDURE — 99213 OFFICE O/P EST LOW 20 MIN: CPT | Performed by: PHYSICIAN ASSISTANT

## 2019-07-26 NOTE — PROGRESS NOTES
Problem list     Subjective   Mag Wu is a 73 y.o. female     Chief Complaint   Patient presents with   • Atrial Fibrillation   • Coronary Artery Disease   Problem List:      1. Minor nonobstructive disease by previous caths per patient's report, inadequate data.  2. Dilated cardiomyopathy status post AICD implantation.  3. Atrial fibrillation on Coumadin  4. Hyperlipidemia      5. Sjogren's Syndrome       HPI  The patient presents in today for routine evaluation, follow-up, and ICD interrogation.  Since last evaluation the patient is continued to do well.  She has had recurrent episodes of hypotension subjectively at home.  Irregardless of hypertension, the patient reports that she has no weakness, dizziness, and certainly no syncope.  She has no symptoms or issues with her hypotension.  She has done very well as a matter fact on her current cardioprotective medications.  We reviewed that we would not be changing that less she developed issues with hypotension.  She reports no chest pain.  She has stable dyspnea.  She has no PND orthopnea.  She had a normal device check today with appropriate function of her ICD.    Current Outpatient Medications   Medication Sig Dispense Refill   • butalbital-acetaminophen-caffeine (FIORICET, ESGIC) -40 MG per tablet Take 1 tablet by mouth 3 (Three) Times a Day.     • ESTRACE VAGINAL 0.1 MG/GM vaginal cream Insert 2 g into the vagina Daily.     • folic acid (FOLVITE) 400 MCG tablet Take 400 mcg by mouth Daily.     • levothyroxine (SYNTHROID, LEVOTHROID) 25 MCG tablet Take 25 mcg by mouth Daily.     • lisinopril (PRINIVIL,ZESTRIL) 5 MG tablet Take 5 mg by mouth Daily.     • metoprolol tartrate (LOPRESSOR) 50 MG tablet Take 50 mg by mouth Daily.     • Multiple Minerals-Vitamins (CALCIUM CITRATE +) tablet Take  by mouth.     • Multiple Vitamin (MULTIVITAMINS PO) Take  by mouth Daily.     • omeprazole (priLOSEC) 20 MG capsule Take 20 mg by mouth Daily.     • primidone  (MYSOLINE) 50 MG tablet Take 50 mg by mouth Every Night.     • Probiotic Product (PROBIOTIC DAILY PO) Take  by mouth.     • SUMAtriptan (IMITREX) 50 MG tablet Take 50 mg by mouth As Needed.     • vitamin B-12 (CYANOCOBALAMIN) 1000 MCG tablet Take 1,000 mcg by mouth 2 (Two) Times a Day.     • warfarin (COUMADIN) 10 MG tablet Take 10 mg by mouth Daily.       No current facility-administered medications for this visit.        Patient has no known allergies.    Past Medical History:   Diagnosis Date   • Anemia    • Atherosclerotic heart disease of native coronary artery without angina pectoris    • Atrial fibrillation (CMS/HCC) 8/25/2016   • Dilated cardiomyopathy (CMS/HCC)    • Hyperlipidemia 8/25/2016   • Hypothyroidism    • Sjogren's disease (CMS/HCC)        Social History     Socioeconomic History   • Marital status:      Spouse name: Not on file   • Number of children: Not on file   • Years of education: Not on file   • Highest education level: Not on file   Tobacco Use   • Smoking status: Never Smoker   • Smokeless tobacco: Never Used   Substance and Sexual Activity   • Alcohol use: No   • Drug use: No   • Sexual activity: Defer       Family History   Problem Relation Age of Onset   • Other Father         coronary arteriosclerosis   • Other Brother         cardiac pacemaker       Review of Systems   Constitutional: Negative.  Negative for fatigue.   HENT: Negative.  Negative for congestion, rhinorrhea and sneezing.    Eyes: Positive for visual disturbance (wears glasses daily).   Respiratory: Positive for chest tightness (occasional chest tightness ) and shortness of breath (SOA on exertion only). Negative for cough and wheezing.    Cardiovascular: Positive for palpitations (occasional palpitations/current flutters after interrigation). Negative for chest pain and leg swelling.   Gastrointestinal: Negative.  Negative for abdominal pain, nausea and vomiting.   Endocrine: Negative.  Negative for cold  "intolerance and heat intolerance.   Genitourinary: Negative.  Negative for difficulty urinating, frequency and urgency.   Musculoskeletal: Negative.  Negative for arthralgias, back pain, myalgias, neck pain and neck stiffness.   Skin: Negative.  Negative for rash and wound.   Allergic/Immunologic: Negative.  Negative for environmental allergies and food allergies.   Neurological: Negative.  Negative for dizziness, syncope, weakness, light-headedness and headaches.   Hematological: Negative.  Does not bruise/bleed easily.   Psychiatric/Behavioral: Negative for agitation, confusion and sleep disturbance. The patient is not nervous/anxious.        Objective   Vitals:    07/26/19 1130   BP: 121/75   BP Location: Left arm   Patient Position: Sitting   Pulse: 70   SpO2: 97%   Weight: 66.3 kg (146 lb 3.2 oz)   Height: 175.3 cm (69\")      /75 (BP Location: Left arm, Patient Position: Sitting)   Pulse 70   Ht 175.3 cm (69\")   Wt 66.3 kg (146 lb 3.2 oz)   SpO2 97%   BMI 21.59 kg/m²    Lab Results (most recent)     None        Physical Exam   Constitutional: She is oriented to person, place, and time. She appears well-developed and well-nourished. No distress.   HENT:   Head: Normocephalic and atraumatic.   Eyes: Conjunctivae and EOM are normal. Pupils are equal, round, and reactive to light.   Neck: Normal range of motion. Neck supple. No JVD present. No tracheal deviation present.   Cardiovascular: Normal rate, regular rhythm and intact distal pulses.   Murmur heard.   Systolic (LLSB) murmur is present with a grade of 1/6.  Pulmonary/Chest: Effort normal and breath sounds normal.   Abdominal: Soft. Bowel sounds are normal. She exhibits no distension and no mass. There is no tenderness. There is no rebound and no guarding.   Musculoskeletal: Normal range of motion. She exhibits no edema, tenderness or deformity.   Neurological: She is alert and oriented to person, place, and time.   Skin: Skin is warm and dry. No " rash noted. No erythema. No pallor.   Psychiatric: She has a normal mood and affect. Her behavior is normal. Judgment and thought content normal.   Nursing note and vitals reviewed.        Procedure   Procedures       Assessment/Plan      Diagnosis Plan   1. Dilated cardiomyopathy (CMS/HCC)     2. Atrial fibrillation, unspecified type (CMS/HCC)     3. ICD (implantable cardioverter-defibrillator) in place     4. Shortness of breath       1.  The patient continues to do well at this time from cardiovascular standpoint.  She denies symptoms of angina, failure, or dysrhythmias.  She has intermittent hypotension at home which is very well tolerated.  I would not manipulate current cardioprotective medications that she is doing well at this time.  For symptoms of hypotension she will call and we will adjust accordingly.    2.  The patient has no dysrhythmic symptoms.  I would continue current rate control medications and anticoagulation therapy otherwise directed towards ARachell kuo.  We will follow along closely in that regard.    3.  The patient reports that she is doing well otherwise clinically.  Her dyspnea is chronic but at baseline, certainly not problematic.  She had an echocardiogram approximately 6 months ago at [facility].  That suggested stable systolic function at approximately 45%.  She had moderate MR and TR as well, again is stable findings.  We reviewed this with her today.    4.  ICD interrogation today suggest stable function with adequate battery life.  We will continue to follow interrogations every 3 months to the clinic and can follow with this patient every 6 months.  She will call for any issues prior to follow-up.  Nothing further at this time.           Patient's Body mass index is 21.59 kg/m². BMI is within normal parameters. No follow-up required..             Electronically signed by:

## 2019-11-22 ENCOUNTER — OFFICE VISIT (OUTPATIENT)
Dept: CARDIOLOGY | Facility: CLINIC | Age: 74
End: 2019-11-22

## 2019-11-22 DIAGNOSIS — I42.0 DILATED CARDIOMYOPATHY (HCC): Primary | ICD-10-CM

## 2019-11-22 PROCEDURE — 93284 PRGRMG EVAL IMPLANTABLE DFB: CPT | Performed by: INTERNAL MEDICINE

## 2020-05-22 ENCOUNTER — OFFICE VISIT (OUTPATIENT)
Dept: CARDIOLOGY | Facility: CLINIC | Age: 75
End: 2020-05-22

## 2020-05-22 VITALS
HEART RATE: 78 BPM | DIASTOLIC BLOOD PRESSURE: 80 MMHG | SYSTOLIC BLOOD PRESSURE: 128 MMHG | WEIGHT: 147 LBS | TEMPERATURE: 97.3 F | BODY MASS INDEX: 21.77 KG/M2 | HEIGHT: 69 IN | OXYGEN SATURATION: 97 %

## 2020-05-22 DIAGNOSIS — R06.02 SHORTNESS OF BREATH: ICD-10-CM

## 2020-05-22 DIAGNOSIS — I42.0 DILATED CARDIOMYOPATHY (HCC): Primary | ICD-10-CM

## 2020-05-22 DIAGNOSIS — I48.91 ATRIAL FIBRILLATION, UNSPECIFIED TYPE (HCC): Primary | ICD-10-CM

## 2020-05-22 DIAGNOSIS — I42.0 DILATED CARDIOMYOPATHY (HCC): ICD-10-CM

## 2020-05-22 PROCEDURE — 99213 OFFICE O/P EST LOW 20 MIN: CPT | Performed by: PHYSICIAN ASSISTANT

## 2020-05-22 PROCEDURE — 93284 PRGRMG EVAL IMPLANTABLE DFB: CPT | Performed by: INTERNAL MEDICINE

## 2020-05-22 PROCEDURE — 93000 ELECTROCARDIOGRAM COMPLETE: CPT | Performed by: PHYSICIAN ASSISTANT

## 2020-05-22 RX ORDER — SERTRALINE HYDROCHLORIDE 25 MG/1
1 TABLET, FILM COATED ORAL DAILY
COMMUNITY
Start: 2020-03-28 | End: 2022-03-25

## 2020-05-22 NOTE — PROGRESS NOTES
Problem list     Subjective   Mag Wu is a 74 y.o. female     Chief Complaint   Patient presents with   • Atrial Fibrillation     Here for a 9 month follow up    • Cardiomyopathy   • Coronary Artery Disease   Problem List:      1. Minor nonobstructive disease by previous caths per patient's report, inadequate data.  2. Dilated cardiomyopathy status post AICD implantation.  3. Atrial fibrillation on Coumadin  4. Hyperlipidemia      5. Sjogren's Syndrome    HPI  The patient presents in today for evaluation and follow-up.  Since last appointment here, the patient is continued to do fairly well from cardiovascular standpoint.  She reports no dysrhythmic symptoms to suggest any issues with A. fib.  She currently takes Coumadin but queries the potential for changing to Eliquis or an alternate anticoagulant.  She apparently has to take frequent antibiotics because of recurrent UTI issues.  Her Coumadin level is frequently affected.  She would like to consider alternate therapy.  She confirms with me that she would like to check with her pharmacy and contact us back if that is an option.  Her main concern would be financial at this time.  Irregardless, she has no chest pain.  She has stable dyspnea.  She has no PND orthopnea.  She did have an ICD interrogation today which indicates normal and stable function.  Current Outpatient Medications on File Prior to Visit   Medication Sig Dispense Refill   • butalbital-acetaminophen-caffeine (FIORICET, ESGIC) -40 MG per tablet Take 1 tablet by mouth 3 (Three) Times a Day.     • ESTRACE VAGINAL 0.1 MG/GM vaginal cream Insert 2 g into the vagina Daily.     • folic acid (FOLVITE) 400 MCG tablet Take 400 mcg by mouth Daily.     • levothyroxine (SYNTHROID, LEVOTHROID) 25 MCG tablet Take 25 mcg by mouth Daily.     • lisinopril (PRINIVIL,ZESTRIL) 5 MG tablet Take 5 mg by mouth Daily.     • metoprolol tartrate (LOPRESSOR) 50 MG tablet Take 50 mg by mouth Daily.     •  Multiple Minerals-Vitamins (CALCIUM CITRATE +) tablet Take  by mouth.     • Multiple Vitamin (MULTIVITAMINS PO) Take  by mouth Daily.     • omeprazole (priLOSEC) 20 MG capsule Take 20 mg by mouth Daily.     • primidone (MYSOLINE) 50 MG tablet Take 50 mg by mouth Every Night.     • Probiotic Product (PROBIOTIC DAILY PO) Take  by mouth.     • sertraline (ZOLOFT) 25 MG tablet Take 1 tablet by mouth Daily.     • SUMAtriptan (IMITREX) 50 MG tablet Take 50 mg by mouth As Needed.     • vitamin B-12 (CYANOCOBALAMIN) 1000 MCG tablet Take 1,000 mcg by mouth 2 (Two) Times a Day.     • warfarin (COUMADIN) 10 MG tablet Take 10 mg by mouth Daily. Takes 1/2 tablet on Monday and 1 tablet all other days       No current facility-administered medications on file prior to visit.        Patient has no known allergies.    Past Medical History:   Diagnosis Date   • Anemia    • Atherosclerotic heart disease of native coronary artery without angina pectoris    • Atrial fibrillation (CMS/HCC) 8/25/2016   • Dilated cardiomyopathy (CMS/HCC)    • Hyperlipidemia 8/25/2016   • Hypothyroidism    • Sjogren's disease (CMS/HCC)        Social History     Socioeconomic History   • Marital status:      Spouse name: Not on file   • Number of children: Not on file   • Years of education: Not on file   • Highest education level: Not on file   Tobacco Use   • Smoking status: Never Smoker   • Smokeless tobacco: Never Used   Substance and Sexual Activity   • Alcohol use: No   • Drug use: No   • Sexual activity: Defer       Family History   Problem Relation Age of Onset   • Other Father         coronary arteriosclerosis   • Hypertension Father    • Heart disease Father    • Other Brother         cardiac pacemaker   • Hypertension Mother    • Cancer Mother        Review of Systems   Constitutional: Negative.  Negative for chills, fatigue and fever.   HENT: Negative.  Negative for congestion, rhinorrhea and sore throat.    Eyes: Positive for visual  "disturbance (glasses daily ).   Respiratory: Negative.  Negative for chest tightness and shortness of breath.    Cardiovascular: Negative.  Negative for chest pain, palpitations and leg swelling.   Gastrointestinal: Negative.  Negative for abdominal pain, blood in stool, nausea and vomiting.   Endocrine: Negative.  Negative for cold intolerance and heat intolerance.   Genitourinary: Negative.  Negative for dysuria, frequency, hematuria and urgency.        Patient has frequent UTI's and the antibiotics interfere with Coumadin. Wants to discuss changing to something else.    Musculoskeletal: Positive for arthralgias (joints ) and back pain (upper back pain ).   Skin: Negative.  Negative for rash and wound.   Allergic/Immunologic: Positive for food allergies (gluten). Negative for environmental allergies.   Neurological: Negative.  Negative for dizziness, weakness and light-headedness.   Hematological: Bruises/bleeds easily.   Psychiatric/Behavioral: Negative.  Negative for agitation, confusion and sleep disturbance (denies waking with smothering ). The patient is not nervous/anxious.        Objective   Vitals:    05/22/20 1011   BP: 128/80   BP Location: Left arm   Patient Position: Sitting   Pulse: 78   Temp: 97.3 °F (36.3 °C)   SpO2: 97%   Weight: 66.7 kg (147 lb)   Height: 175.3 cm (69\")      /80 (BP Location: Left arm, Patient Position: Sitting)   Pulse 78   Temp 97.3 °F (36.3 °C)   Ht 175.3 cm (69\")   Wt 66.7 kg (147 lb)   SpO2 97%   BMI 21.71 kg/m²    Lab Results (most recent)     None        Physical Exam   Constitutional: She is oriented to person, place, and time. She appears well-developed and well-nourished. No distress.   HENT:   Head: Normocephalic and atraumatic.   Eyes: Pupils are equal, round, and reactive to light. Conjunctivae and EOM are normal.   Neck: Normal range of motion. Neck supple. No JVD present. No tracheal deviation present.   Cardiovascular: Normal rate, regular rhythm and " intact distal pulses.   Murmur heard.   Systolic (LLSB) murmur is present with a grade of 1/6.  Pulmonary/Chest: Effort normal and breath sounds normal.   Abdominal: Soft. Bowel sounds are normal. She exhibits no distension and no mass. There is no tenderness. There is no rebound and no guarding.   Musculoskeletal: Normal range of motion. She exhibits no edema, tenderness or deformity.   Neurological: She is alert and oriented to person, place, and time.   Skin: Skin is warm and dry. No rash noted. No erythema. No pallor.   Psychiatric: She has a normal mood and affect. Her behavior is normal. Judgment and thought content normal.   Nursing note and vitals reviewed.        Procedure     ECG 12 Lead  Date/Time: 5/22/2020 10:12 AM  Performed by: Dong Barr PA  Authorized by: Dong Barr PA   Comparison: compared with previous ECG from 1/25/2019  Comparison to previous ECG: Paced rhythm without acute changes noted.                 Assessment/Plan      Diagnosis Plan   1. Atrial fibrillation, unspecified type (CMS/HCC)  ECG 12 Lead   2. Dilated cardiomyopathy (CMS/HCC)     3. Shortness of breath       1.  At this time, the patient appears to be doing fairly well from cardiovascular standpoint.  She appears well compensated with regards to dilated cardiomyopathy.  A. fib appears to be fairly well treated with rate control and anticoagulation therapies.  Her dyspnea is stable and at baseline.  She has no cardiac issues or symptoms otherwise.    2.  ICD interrogation today indicates normal function.  We will continue that every 3 months through the clinic.    3.  In that setting, as the patient is stable, nothing further for now.  We will continue to see her on 6-month intervals through the clinic.  She will call for any complications prior to follow-up.           Mag A Jazmin  reports that she has never smoked. She has never used smokeless tobacco..         Patient's Body mass index is 21.71 kg/m². BMI  is within normal parameters. No follow-up required..         Advance Care Planning   ACP discussion was declined by the patient. Patient has an advance directive (not in EMR), copy requested.      Electronically signed by:

## 2020-05-27 ENCOUNTER — TELEPHONE (OUTPATIENT)
Dept: CARDIOLOGY | Facility: CLINIC | Age: 75
End: 2020-05-27

## 2020-08-28 ENCOUNTER — OFFICE VISIT (OUTPATIENT)
Dept: CARDIOLOGY | Facility: CLINIC | Age: 75
End: 2020-08-28

## 2020-08-28 VITALS
WEIGHT: 146 LBS | TEMPERATURE: 97.3 F | SYSTOLIC BLOOD PRESSURE: 100 MMHG | HEIGHT: 69 IN | HEART RATE: 70 BPM | OXYGEN SATURATION: 97 % | BODY MASS INDEX: 21.62 KG/M2 | DIASTOLIC BLOOD PRESSURE: 62 MMHG

## 2020-08-28 DIAGNOSIS — I42.0 DILATED CARDIOMYOPATHY (HCC): Primary | ICD-10-CM

## 2020-08-28 DIAGNOSIS — Z95.810 ICD (IMPLANTABLE CARDIOVERTER-DEFIBRILLATOR) IN PLACE: ICD-10-CM

## 2020-08-28 DIAGNOSIS — I48.91 ATRIAL FIBRILLATION, UNSPECIFIED TYPE (HCC): ICD-10-CM

## 2020-08-28 DIAGNOSIS — R06.02 SHORTNESS OF BREATH: ICD-10-CM

## 2020-08-28 PROCEDURE — 93289 INTERROG DEVICE EVAL HEART: CPT | Performed by: INTERNAL MEDICINE

## 2020-08-28 PROCEDURE — 99213 OFFICE O/P EST LOW 20 MIN: CPT | Performed by: PHYSICIAN ASSISTANT

## 2020-08-28 NOTE — PROGRESS NOTES
Problem list     Subjective   Mag Wu is a 74 y.o. female     Chief Complaint   Patient presents with   • Atrial Fibrillation     presents for 3 month f/u   • Cardiomyopathy   • Dizziness   • Loss of Consciousness   Problem List:      1. Minor nonobstructive disease by previous caths per patient's report, inadequate data.  2. Dilated cardiomyopathy status post AICD implantation.  2.1.  Repeat echocardiogram, 5/19, suggesting an EF at 45 to 50%.  3. Atrial fibrillation on Coumadin  4. Hyperlipidemia      5. Sjogren's Syndrome    HPI    This pleasant patient presents in today for routine evaluation and follow-up.  Since last appointment here, the patient is continued to do well from cardiovascular standpoint.  She reports stable dyspnea.  She has no chest pain.  She has no PND or orthopnea.  She had one episode of presyncope while working out in the yard.  She had no antecedent symptoms of any significance.  She describes dizziness more as vertigo at that time.  She got very weak.  She tells me that she may have lost complete consciousness at one time, but only for 1 or 2 seconds.  She did not check her blood pressure vital signs otherwise at that time.  Her device check today indicates no significant dysrhythmic activity during that episode however.  She had no rate or rhythm disturbance issues otherwise.  The patient reports that she has since been checking her blood pressure and has been noted to be very hypotensive.  She was advised to her primary care provider to decrease lisinopril, for which she did.  Blood pressures have progressively improved since that time, although she does still have hypotension at times.  She reports no failure symptoms.  She has no further complaints otherwise and feels that she is doing well.  Current Outpatient Medications on File Prior to Visit   Medication Sig Dispense Refill   • butalbital-acetaminophen-caffeine (FIORICET, ESGIC) -40 MG per tablet Take 1 tablet by  mouth 3 (Three) Times a Day.     • ESTRACE VAGINAL 0.1 MG/GM vaginal cream Insert 2 g into the vagina Daily.     • folic acid (FOLVITE) 400 MCG tablet Take 400 mcg by mouth Daily.     • levothyroxine (SYNTHROID, LEVOTHROID) 25 MCG tablet Take 25 mcg by mouth Daily.     • lisinopril (PRINIVIL,ZESTRIL) 5 MG tablet Take 5 mg by mouth Daily.     • metoprolol tartrate (LOPRESSOR) 50 MG tablet Take 50 mg by mouth Daily.     • Multiple Minerals-Vitamins (CALCIUM CITRATE +) tablet Take  by mouth.     • Multiple Vitamin (MULTIVITAMINS PO) Take  by mouth Daily.     • omeprazole (priLOSEC) 20 MG capsule Take 20 mg by mouth Daily.     • primidone (MYSOLINE) 50 MG tablet Take 50 mg by mouth Every Night.     • Probiotic Product (PROBIOTIC DAILY PO) Take  by mouth.     • sertraline (ZOLOFT) 25 MG tablet Take 1 tablet by mouth Daily.     • SUMAtriptan (IMITREX) 50 MG tablet Take 50 mg by mouth As Needed.     • vitamin B-12 (CYANOCOBALAMIN) 1000 MCG tablet Take 1,000 mcg by mouth 2 (Two) Times a Day.     • warfarin (COUMADIN) 10 MG tablet Take 10 mg by mouth Daily. Takes 1/2 tablet on Monday and 1 tablet all other days       No current facility-administered medications on file prior to visit.        Patient has no known allergies.    Past Medical History:   Diagnosis Date   • Anemia    • Atherosclerotic heart disease of native coronary artery without angina pectoris    • Atrial fibrillation (CMS/HCC) 8/25/2016   • Dilated cardiomyopathy (CMS/HCC)    • Hyperlipidemia 8/25/2016   • Hypothyroidism    • Sjogren's disease (CMS/HCC)        Social History     Socioeconomic History   • Marital status:      Spouse name: Not on file   • Number of children: Not on file   • Years of education: Not on file   • Highest education level: Not on file   Tobacco Use   • Smoking status: Never Smoker   • Smokeless tobacco: Never Used   Substance and Sexual Activity   • Alcohol use: No   • Drug use: No   • Sexual activity: Defer       Family  "History   Problem Relation Age of Onset   • Other Father         coronary arteriosclerosis   • Hypertension Father    • Heart disease Father    • Other Brother         cardiac pacemaker   • Hypertension Mother    • Cancer Mother        Review of Systems   Constitutional: Positive for fatigue. Negative for chills, diaphoresis and fever.   HENT: Negative.    Eyes: Positive for visual disturbance.   Respiratory: Positive for shortness of breath (with increased activity). Negative for apnea and cough.    Cardiovascular: Positive for palpitations. Negative for chest pain and leg swelling.   Gastrointestinal: Negative.  Negative for abdominal pain, blood in stool, constipation, diarrhea, nausea and vomiting.   Endocrine: Negative.    Genitourinary: Negative.  Negative for hematuria.   Musculoskeletal: Positive for arthralgias. Negative for back pain, myalgias and neck pain.   Skin: Negative.  Negative for rash and wound.   Allergic/Immunologic: Negative.  Negative for environmental allergies and food allergies.   Neurological: Positive for dizziness (comes and goes ), syncope (1st and only episode last month while bent over working in the yard ) and weakness. Negative for light-headedness, numbness and headaches.   Hematological: Bruises/bleeds easily.   Psychiatric/Behavioral: Negative for agitation and sleep disturbance. The patient is nervous/anxious.        Objective   Vitals:    08/28/20 0947 08/28/20 1002 08/28/20 1003 08/28/20 1004   BP: 120/74 116/73 117/72 100/62   BP Location: Left arm Left arm Left arm Left arm   Patient Position: Sitting Lying Sitting Standing   Pulse: 74 70 71 70   Temp: 97.3 °F (36.3 °C)      SpO2: 97%      Weight: 66.2 kg (146 lb)      Height: 175.3 cm (69\")         /62 (BP Location: Left arm, Patient Position: Standing)   Pulse 70   Temp 97.3 °F (36.3 °C)   Ht 175.3 cm (69\")   Wt 66.2 kg (146 lb)   SpO2 97%   BMI 21.56 kg/m²    Lab Results (most recent)     None        Physical " Exam   Constitutional: She is oriented to person, place, and time. She appears well-developed and well-nourished. No distress.   HENT:   Head: Normocephalic and atraumatic.   Eyes: Pupils are equal, round, and reactive to light. Conjunctivae and EOM are normal.   Neck: Normal range of motion. Neck supple. No JVD present. No tracheal deviation present.   Cardiovascular: Normal rate, regular rhythm and intact distal pulses.   Murmur heard.   Systolic (LLSB) murmur is present with a grade of 1/6.  Pulmonary/Chest: Effort normal and breath sounds normal.   Abdominal: Soft. Bowel sounds are normal. She exhibits no distension and no mass. There is no tenderness. There is no rebound and no guarding.   Musculoskeletal: Normal range of motion. She exhibits no edema, tenderness or deformity.   Neurological: She is alert and oriented to person, place, and time.   Skin: Skin is warm and dry. No rash noted. No erythema. No pallor.   Psychiatric: She has a normal mood and affect. Her behavior is normal. Judgment and thought content normal.   Nursing note and vitals reviewed.        Procedure   Procedures       Assessment/Plan      Diagnosis Plan   1. Dilated cardiomyopathy (CMS/HCC)     2. Atrial fibrillation, unspecified type (CMS/HCC)     3. Shortness of breath     4. ICD (implantable cardioverter-defibrillator) in place       1.  At this time, the patient appears to be doing well.  She reports no decompensation with regards to dilated cardiomyopathy.  She has stable dyspnea.  She appears stable otherwise from general cardiovascular standpoint.    2.  ICD interrogation today indicates normal function with 5 years battery life remaining.  We will continue to check that every 3 months through the clinic.    3.  With regards to A. fib, the patient is doing well with rate control and anticoagulation therapy.  PT/INRs are followed through her primary care provider.  I would make no changes in her current medical regimen.    4.  We  will continue to see her on 6-month intervals at this time.  I feel she is doing well at this time.  She will call for complications.           Mag Wu  reports that she has never smoked. She has never used smokeless tobacco.        Patient's Body mass index is 21.56 kg/m². BMI is within normal parameters. No follow-up required..             Electronically signed by:

## 2020-11-13 ENCOUNTER — OFFICE VISIT (OUTPATIENT)
Dept: CARDIOLOGY | Facility: CLINIC | Age: 75
End: 2020-11-13

## 2020-11-13 DIAGNOSIS — I48.91 ATRIAL FIBRILLATION, UNSPECIFIED TYPE (HCC): ICD-10-CM

## 2020-11-13 DIAGNOSIS — I42.0 DILATED CARDIOMYOPATHY (HCC): ICD-10-CM

## 2020-11-13 PROCEDURE — 93289 INTERROG DEVICE EVAL HEART: CPT | Performed by: INTERNAL MEDICINE

## 2021-01-25 ENCOUNTER — TELEPHONE (OUTPATIENT)
Dept: CARDIOLOGY | Facility: CLINIC | Age: 76
End: 2021-01-25

## 2021-01-25 RX ORDER — RIVAROXABAN 20 MG/1
20 TABLET, FILM COATED ORAL DAILY
COMMUNITY
Start: 2021-01-20

## 2021-01-25 NOTE — TELEPHONE ENCOUNTER
Patient's chart updated with med changes. Per Dong Barr, patient does not need aspirin unless there is a time she needs to hold Xarelto. Siobhan Aleman MA      ----- Message from Siobhan Aleman MA sent at 1/25/2021 12:43 PM EST -----  LALITO MARIE CHANGED PT FROM COUMADIN TO XARELTO. PT WANTS TO KNOW WHAT DOSAGE OF APIRIN SHE SHOULD BE TAKING. Heidy

## 2021-02-26 ENCOUNTER — OFFICE VISIT (OUTPATIENT)
Dept: CARDIOLOGY | Facility: CLINIC | Age: 76
End: 2021-02-26

## 2021-02-26 VITALS
OXYGEN SATURATION: 98 % | DIASTOLIC BLOOD PRESSURE: 78 MMHG | WEIGHT: 148.6 LBS | SYSTOLIC BLOOD PRESSURE: 126 MMHG | BODY MASS INDEX: 22.01 KG/M2 | HEIGHT: 69 IN | HEART RATE: 70 BPM

## 2021-02-26 DIAGNOSIS — I48.20 CHRONIC ATRIAL FIBRILLATION (HCC): ICD-10-CM

## 2021-02-26 DIAGNOSIS — Z95.810 ICD (IMPLANTABLE CARDIOVERTER-DEFIBRILLATOR) IN PLACE: ICD-10-CM

## 2021-02-26 DIAGNOSIS — I42.0 DILATED CARDIOMYOPATHY (HCC): Primary | ICD-10-CM

## 2021-02-26 DIAGNOSIS — I25.10 ATHEROSCLEROSIS OF NATIVE CORONARY ARTERY WITHOUT ANGINA PECTORIS, UNSPECIFIED WHETHER NATIVE OR TRANSPLANTED HEART: ICD-10-CM

## 2021-02-26 PROCEDURE — 99213 OFFICE O/P EST LOW 20 MIN: CPT | Performed by: PHYSICIAN ASSISTANT

## 2021-02-26 PROCEDURE — 93289 INTERROG DEVICE EVAL HEART: CPT | Performed by: INTERNAL MEDICINE

## 2021-02-26 RX ORDER — LISINOPRIL 5 MG/1
5 TABLET ORAL DAILY
Qty: 90 TABLET | Refills: 3 | Status: SHIPPED | OUTPATIENT
Start: 2021-02-26

## 2021-02-26 NOTE — PROGRESS NOTES
Problem list     Subjective   Mag Wu is a 75 y.o. female     Chief Complaint   Patient presents with   • Cardiomyopathy     presents for 6 month f/u   • Atrial Fibrillation   • Palpitations   • Shortness of Breath   Problem List:      1. Minor nonobstructive disease by previous caths per patient's report, inadequate data.  2. Dilated cardiomyopathy status post AICD implantation.  2.1.  Repeat echocardiogram, 5/19, suggesting an EF at 45 to 50%.  3. Atrial fibrillation on Coumadin  4. Hyperlipidemia      5. Sjogren's Syndrome    HPI  The patient presents into the clinic today for routine evaluation and follow-up.  She also presents for ICD interrogation.  Since last evaluation, the patient has done well for the most part from cardiovascular standpoint.  She denies current chest discomfort.  She has stable dyspnea.  She has no failure or dysrhythmic symptoms.  Blood pressures fluctuate but typically are low.  She gets weak and dizzy at that time.  She manages this fairly well and would like to continue her medications however.  ICD interrogation today indicates normal function.  We will continue that every 3 months.  The patient has no further complaints.    Current Outpatient Medications on File Prior to Visit   Medication Sig Dispense Refill   • butalbital-acetaminophen-caffeine (FIORICET, ESGIC) -40 MG per tablet Take 1 tablet by mouth 3 (Three) Times a Day.     • Calcium Carb-Cholecalciferol (CALCIUM 600+D3 PO) Take  by mouth Daily.     • ESTRACE VAGINAL 0.1 MG/GM vaginal cream Insert 2 g into the vagina Daily.     • folic acid (FOLVITE) 400 MCG tablet Take 400 mcg by mouth Daily.     • levothyroxine (SYNTHROID, LEVOTHROID) 25 MCG tablet Take 25 mcg by mouth Daily.     • lisinopril (PRINIVIL,ZESTRIL) 5 MG tablet Take 5 mg by mouth Daily.     • metoprolol tartrate (LOPRESSOR) 50 MG tablet Take 50 mg by mouth Daily.     • Multiple Vitamin (MULTIVITAMINS PO) Take  by mouth Daily.     • omeprazole  (priLOSEC) 20 MG capsule Take 20 mg by mouth Daily.     • primidone (MYSOLINE) 50 MG tablet Take 50 mg by mouth Every Night.     • Probiotic Product (PROBIOTIC DAILY PO) Take  by mouth.     • sertraline (ZOLOFT) 25 MG tablet Take 1 tablet by mouth Daily.     • SUMAtriptan (IMITREX) 50 MG tablet Take 50 mg by mouth As Needed.     • vitamin B-12 (CYANOCOBALAMIN) 1000 MCG tablet Take 1,000 mcg by mouth 2 (Two) Times a Day.     • Xarelto 20 MG tablet Take 20 mg by mouth Daily. with food.     • [DISCONTINUED] Multiple Minerals-Vitamins (CALCIUM CITRATE +) tablet Take  by mouth.       No current facility-administered medications on file prior to visit.        Patient has no known allergies.    Past Medical History:   Diagnosis Date   • Anemia    • Atherosclerotic heart disease of native coronary artery without angina pectoris    • Atrial fibrillation (CMS/HCC) 8/25/2016   • Celiac disease    • Dilated cardiomyopathy (CMS/HCC)    • Hyperlipidemia 8/25/2016   • Hypothyroidism    • Sjogren's disease (CMS/HCC)        Social History     Socioeconomic History   • Marital status:      Spouse name: Not on file   • Number of children: Not on file   • Years of education: Not on file   • Highest education level: Not on file   Tobacco Use   • Smoking status: Never Smoker   • Smokeless tobacco: Never Used   Substance and Sexual Activity   • Alcohol use: No   • Drug use: No   • Sexual activity: Defer       Family History   Problem Relation Age of Onset   • Other Father         coronary arteriosclerosis   • Hypertension Father    • Heart disease Father    • Other Brother         cardiac pacemaker   • Hypertension Mother    • Cancer Mother        Review of Systems   Constitutional: Negative.  Negative for chills, diaphoresis, fatigue and fever.   HENT: Negative.    Eyes: Positive for visual disturbance.   Respiratory: Positive for shortness of breath (on exertion). Negative for apnea, cough, chest tightness and wheezing.     "  Cardiovascular: Positive for palpitations. Negative for chest pain and leg swelling.   Gastrointestinal: Negative.    Endocrine: Negative.    Genitourinary: Negative.  Negative for hematuria.   Musculoskeletal: Positive for arthralgias. Negative for back pain, myalgias and neck pain.   Skin: Negative.    Allergic/Immunologic: Positive for food allergies ( Celiac disease). Negative for environmental allergies.   Neurological: Positive for tremors and light-headedness. Negative for dizziness, syncope, weakness and headaches.   Hematological: Bruises/bleeds easily.   Psychiatric/Behavioral: Negative.  Negative for agitation and sleep disturbance. The patient is not nervous/anxious.        Objective   Vitals:    02/26/21 1038   BP: 126/78   BP Location: Left arm   Patient Position: Sitting   Pulse: 70   SpO2: 98%   Weight: 67.4 kg (148 lb 9.6 oz)   Height: 175.3 cm (69.02\")      /78 (BP Location: Left arm, Patient Position: Sitting)   Pulse 70   Ht 175.3 cm (69.02\")   Wt 67.4 kg (148 lb 9.6 oz)   SpO2 98%   BMI 21.93 kg/m²    Lab Results (most recent)     None        Physical Exam  Vitals signs and nursing note reviewed.   Constitutional:       General: She is not in acute distress.     Appearance: She is well-developed.   HENT:      Head: Normocephalic and atraumatic.   Eyes:      Conjunctiva/sclera: Conjunctivae normal.      Pupils: Pupils are equal, round, and reactive to light.   Neck:      Musculoskeletal: Normal range of motion and neck supple.      Vascular: No JVD.      Trachea: No tracheal deviation.   Cardiovascular:      Rate and Rhythm: Normal rate and regular rhythm.      Heart sounds: Normal heart sounds.   Pulmonary:      Effort: Pulmonary effort is normal.      Breath sounds: Normal breath sounds.   Abdominal:      General: Bowel sounds are normal. There is no distension.      Palpations: Abdomen is soft. There is no mass.      Tenderness: There is no abdominal tenderness. There is no " guarding or rebound.   Musculoskeletal: Normal range of motion.         General: No tenderness or deformity.   Skin:     General: Skin is warm and dry.      Coloration: Skin is not pale.      Findings: No erythema or rash.   Neurological:      Mental Status: She is alert and oriented to person, place, and time.   Psychiatric:         Behavior: Behavior normal.         Thought Content: Thought content normal.         Judgment: Judgment normal.           Procedure   Procedures       Assessment/Plan      Diagnosis Plan   1. Dilated cardiomyopathy (CMS/HCC)     2. Chronic atrial fibrillation (CMS/HCC)     3. ICD (implantable cardioverter-defibrillator) in place       1.  Clinically, the patient is doing well.  She has stable dyspnea.  She is well compensated with regards to history of dilated cardiomyopathy.  A. fib is minimally symptomatic at this time.  She has no cardiovascular issues or complaints otherwise.    2.  ICD interrogation today indicates normal function.  We will continue routine interrogations every 3 months through the clinic.    3.  I feel the patient is on appropriate medications at this time and would make no adjustments.  For change in clinical course the patient will call.  Otherwise, we will continue to see her 6 months routinely through the clinic otherwise.           Mag Wu  reports that she has never smoked. She has never used smokeless tobacco..       Patient's Body mass index is 21.93 kg/m². BMI is within normal parameters. No follow-up required..             Electronically signed by:

## 2021-05-28 ENCOUNTER — OFFICE VISIT (OUTPATIENT)
Dept: CARDIOLOGY | Facility: CLINIC | Age: 76
End: 2021-05-28

## 2021-05-28 DIAGNOSIS — I48.20 CHRONIC ATRIAL FIBRILLATION (HCC): ICD-10-CM

## 2021-05-28 DIAGNOSIS — I42.0 DILATED CARDIOMYOPATHY (HCC): ICD-10-CM

## 2021-05-28 PROCEDURE — 93284 PRGRMG EVAL IMPLANTABLE DFB: CPT | Performed by: INTERNAL MEDICINE

## 2021-06-01 ENCOUNTER — TELEPHONE (OUTPATIENT)
Dept: CARDIOLOGY | Facility: CLINIC | Age: 76
End: 2021-06-01

## 2021-06-01 NOTE — TELEPHONE ENCOUNTER
Pt LVM stating she forgot to take her Xarelto yesterday, needs to know if she should take it now.       I called and advised her to taker rx once today, as she would normally.

## 2021-08-27 ENCOUNTER — OFFICE VISIT (OUTPATIENT)
Dept: CARDIOLOGY | Facility: CLINIC | Age: 76
End: 2021-08-27

## 2021-08-27 ENCOUNTER — TELEPHONE (OUTPATIENT)
Dept: CARDIOLOGY | Facility: CLINIC | Age: 76
End: 2021-08-27

## 2021-08-27 VITALS
BODY MASS INDEX: 21.06 KG/M2 | SYSTOLIC BLOOD PRESSURE: 113 MMHG | WEIGHT: 142.2 LBS | HEART RATE: 77 BPM | OXYGEN SATURATION: 97 % | HEIGHT: 69 IN | DIASTOLIC BLOOD PRESSURE: 72 MMHG

## 2021-08-27 DIAGNOSIS — I42.0 DILATED CARDIOMYOPATHY (HCC): ICD-10-CM

## 2021-08-27 DIAGNOSIS — I48.91 ATRIAL FIBRILLATION, UNSPECIFIED TYPE (HCC): Primary | ICD-10-CM

## 2021-08-27 DIAGNOSIS — Z95.810 ICD (IMPLANTABLE CARDIOVERTER-DEFIBRILLATOR) IN PLACE: ICD-10-CM

## 2021-08-27 DIAGNOSIS — I42.0 DILATED CARDIOMYOPATHY (HCC): Primary | ICD-10-CM

## 2021-08-27 PROCEDURE — 99213 OFFICE O/P EST LOW 20 MIN: CPT | Performed by: PHYSICIAN ASSISTANT

## 2021-08-27 PROCEDURE — 93289 INTERROG DEVICE EVAL HEART: CPT | Performed by: INTERNAL MEDICINE

## 2021-08-27 RX ORDER — ASPIRIN 81 MG/1
81 TABLET ORAL DAILY
COMMUNITY

## 2021-08-27 RX ORDER — LATANOPROST 50 UG/ML
1 SOLUTION/ DROPS OPHTHALMIC NIGHTLY
COMMUNITY

## 2021-08-27 NOTE — PATIENT INSTRUCTIONS

## 2021-08-27 NOTE — TELEPHONE ENCOUNTER
Patient was in office today - she picked up samples of   Xarelto 20 mg     Given 4 bottles     Lot # [id number]  Exp : 2/2023      Provider Dong CORONEL       AT [facility]

## 2021-08-27 NOTE — PROGRESS NOTES
Problem list     Subjective   Mag Wu is a 75 y.o. female     Chief Complaint   Patient presents with   • Follow-up     6 month with pacer check    Problem List:      1. Minor nonobstructive disease by previous caths per patient's report, inadequate data.  2. Dilated cardiomyopathy status post AICD implantation.  2.1.  Repeat echocardiogram, 5/19, suggesting an EF at 45 to 50%.  3. Atrial fibrillation on Coumadin  4. Hyperlipidemia      5. Sjogren's Syndrome    HPI  The patient presents into the clinic today for routine evaluation and for ICD interrogation.  She continues to do well from cardiovascular standpoint.  Currently, the patient denies chest pain.  She has stable dyspnea.  She has no failure or dysrhythmic symptoms of any significance.  She reports adequate exercise capacity at home without limitation for cardiac standpoint.  She remains normotensive on current medical regimen.  ICD interrogation today indicates normal function with adequate battery life.  The patient has no further complaints otherwise at this time.    Current Outpatient Medications on File Prior to Visit   Medication Sig Dispense Refill   • aspirin 81 MG EC tablet Take 81 mg by mouth Daily.     • butalbital-acetaminophen-caffeine (FIORICET, ESGIC) -40 MG per tablet Take 1 tablet by mouth 3 (Three) Times a Day.     • Calcium Carb-Cholecalciferol (CALCIUM 600+D3 PO) Take  by mouth Daily.     • ESTRACE VAGINAL 0.1 MG/GM vaginal cream Insert 2 g into the vagina Daily.     • folic acid (FOLVITE) 400 MCG tablet Take 400 mcg by mouth Daily.     • latanoprost (XALATAN) 0.005 % ophthalmic solution 1 drop Every Night.     • levothyroxine (SYNTHROID, LEVOTHROID) 25 MCG tablet Take 25 mcg by mouth Daily.     • lisinopril (PRINIVIL,ZESTRIL) 5 MG tablet Take 1 tablet by mouth Daily. 90 tablet 3   • metoprolol tartrate (LOPRESSOR) 50 MG tablet Take 50 mg by mouth Daily.     • Multiple Vitamin (MULTIVITAMINS PO) Take  by mouth Daily.      • omeprazole (priLOSEC) 20 MG capsule Take 20 mg by mouth Daily.     • primidone (MYSOLINE) 50 MG tablet Take 50 mg by mouth Every Night.     • Probiotic Product (PROBIOTIC DAILY PO) Take  by mouth.     • sertraline (ZOLOFT) 25 MG tablet Take 1 tablet by mouth Daily.     • SUMAtriptan (IMITREX) 50 MG tablet Take 50 mg by mouth As Needed.     • vitamin B-12 (CYANOCOBALAMIN) 1000 MCG tablet Take 1,000 mcg by mouth 2 (Two) Times a Day.     • Xarelto 20 MG tablet Take 20 mg by mouth Daily. with food.       No current facility-administered medications on file prior to visit.       Gluten meal    Past Medical History:   Diagnosis Date   • Anemia    • Atherosclerotic heart disease of native coronary artery without angina pectoris    • Atrial fibrillation (CMS/HCC) 8/25/2016   • Celiac disease    • Dilated cardiomyopathy (CMS/HCC)    • Hyperlipidemia 8/25/2016   • Hypothyroidism    • Sjogren's disease (CMS/HCC)        Social History     Socioeconomic History   • Marital status:      Spouse name: Not on file   • Number of children: Not on file   • Years of education: Not on file   • Highest education level: Not on file   Tobacco Use   • Smoking status: Never Smoker   • Smokeless tobacco: Never Used   Substance and Sexual Activity   • Alcohol use: No   • Drug use: No   • Sexual activity: Defer       Family History   Problem Relation Age of Onset   • Other Father         coronary arteriosclerosis   • Hypertension Father    • Heart disease Father    • Other Brother         cardiac pacemaker   • Hypertension Mother    • Cancer Mother        Review of Systems   Constitutional: Negative.  Negative for chills, fatigue and fever.   HENT: Negative for congestion, rhinorrhea and sore throat.    Eyes: Positive for visual disturbance (glasses).   Respiratory: Negative.  Negative for chest tightness, shortness of breath and wheezing.    Cardiovascular: Positive for palpitations. Negative for chest pain and leg swelling.  "  Gastrointestinal: Negative.    Endocrine: Negative.    Genitourinary: Negative.    Musculoskeletal: Positive for arthralgias and back pain. Negative for neck pain.   Skin: Negative.  Negative for rash and wound.   Allergic/Immunologic: Negative.  Negative for environmental allergies.   Neurological: Positive for headaches. Negative for dizziness, weakness and numbness.   Hematological: Bruises/bleeds easily (bruises/ bleeds).   Psychiatric/Behavioral: Positive for sleep disturbance (falling/ staying asleep ).       Objective   Vitals:    08/27/21 1100   BP: 113/72   BP Location: Left arm   Patient Position: Sitting   Pulse: 77   SpO2: 97%   Weight: 64.5 kg (142 lb 3.2 oz)   Height: 175.3 cm (69\")      /72 (BP Location: Left arm, Patient Position: Sitting)   Pulse 77   Ht 175.3 cm (69\")   Wt 64.5 kg (142 lb 3.2 oz)   SpO2 97%   BMI 21.00 kg/m²    Lab Results (most recent)     None        Physical Exam  Vitals and nursing note reviewed.   Constitutional:       General: She is not in acute distress.     Appearance: She is well-developed.   HENT:      Head: Normocephalic and atraumatic.   Eyes:      Conjunctiva/sclera: Conjunctivae normal.      Pupils: Pupils are equal, round, and reactive to light.   Neck:      Vascular: No JVD.      Trachea: No tracheal deviation.   Cardiovascular:      Rate and Rhythm: Normal rate and regular rhythm.      Heart sounds: Murmur heard.   Systolic (LSB) murmur is present with a grade of 1/6.     Pulmonary:      Effort: Pulmonary effort is normal.      Breath sounds: Normal breath sounds.   Abdominal:      General: Bowel sounds are normal. There is no distension.      Palpations: Abdomen is soft. There is no mass.      Tenderness: There is no abdominal tenderness. There is no guarding or rebound.   Musculoskeletal:         General: No tenderness or deformity. Normal range of motion.      Cervical back: Normal range of motion and neck supple.   Skin:     General: Skin is warm " and dry.      Coloration: Skin is not pale.      Findings: No erythema or rash.   Neurological:      Mental Status: She is alert and oriented to person, place, and time.   Psychiatric:         Behavior: Behavior normal.         Thought Content: Thought content normal.         Judgment: Judgment normal.           Procedure   Procedures       Assessment/Plan      Diagnosis Plan   1. Atrial fibrillation, unspecified type (CMS/HCC)     2. Dilated cardiomyopathy (CMS/HCC)     3. ICD (implantable cardioverter-defibrillator) in place     1.  At this time, the patient is doing well from general cardiovascular standpoint.  She currently denies angina, failure, or dysrhythmic issues.    2.  A. fib continues to be well controlled with rate control and anticoagulation therapy.  She will report back for any issues with the same.    3.  The patient appears well compensated with regards to dilated cardiomyopathy.  She is on appropriate medications for the same.    4.  ICD interrogation today indicates normal function with adequate battery life.  We will continue ICD interrogations every 3 months through the clinic.    5.  Nothing further at this time.  For change in clinical course, she will call.  We will continue to see the patient on routine 6-month intervals.         Advance Care Planning   ACP discussion was held with the patient during this visit. Patient has an advance directive (not in EMR), copy requested.          Electronically signed by:

## 2021-12-10 ENCOUNTER — OFFICE VISIT (OUTPATIENT)
Dept: CARDIOLOGY | Facility: CLINIC | Age: 76
End: 2021-12-10

## 2021-12-10 ENCOUNTER — TELEPHONE (OUTPATIENT)
Dept: CARDIOLOGY | Facility: CLINIC | Age: 76
End: 2021-12-10

## 2021-12-10 DIAGNOSIS — I48.20 CHRONIC ATRIAL FIBRILLATION (HCC): ICD-10-CM

## 2021-12-10 DIAGNOSIS — I42.0 DILATED CARDIOMYOPATHY (HCC): ICD-10-CM

## 2021-12-10 PROCEDURE — 93289 INTERROG DEVICE EVAL HEART: CPT | Performed by: INTERNAL MEDICINE

## 2021-12-10 NOTE — TELEPHONE ENCOUNTER
Patient in the office to  samples     Xarelto 20 mg     Given 3 bottles   Lot # [id number]  Exp: 4/2024    Provider Dong CORONEL     AT [facility]

## 2022-02-09 ENCOUNTER — TELEPHONE (OUTPATIENT)
Dept: CARDIOLOGY | Facility: CLINIC | Age: 77
End: 2022-02-09

## 2022-02-09 RX ORDER — METOPROLOL SUCCINATE 50 MG/1
50 TABLET, EXTENDED RELEASE ORAL DAILY
Qty: 30 TABLET | Refills: 11 | Status: SHIPPED | OUTPATIENT
Start: 2022-02-09 | End: 2022-09-23 | Stop reason: SDUPTHER

## 2022-02-09 NOTE — TELEPHONE ENCOUNTER
Pt requesting Rf to pharmacy,updated chart.    Per Dong Barr, PAC  Verified with [facility]- Metoprolol Succinate ER 50mg 1 tab by mouth daily. Pt aware sent into [facility].

## 2022-03-25 ENCOUNTER — OFFICE VISIT (OUTPATIENT)
Dept: CARDIOLOGY | Facility: CLINIC | Age: 77
End: 2022-03-25

## 2022-03-25 ENCOUNTER — TELEPHONE (OUTPATIENT)
Dept: CARDIOLOGY | Facility: CLINIC | Age: 77
End: 2022-03-25

## 2022-03-25 VITALS
HEIGHT: 69 IN | WEIGHT: 144 LBS | HEART RATE: 81 BPM | SYSTOLIC BLOOD PRESSURE: 106 MMHG | DIASTOLIC BLOOD PRESSURE: 67 MMHG | OXYGEN SATURATION: 96 % | BODY MASS INDEX: 21.33 KG/M2

## 2022-03-25 DIAGNOSIS — I48.91 ATRIAL FIBRILLATION, UNSPECIFIED TYPE: Primary | ICD-10-CM

## 2022-03-25 DIAGNOSIS — I48.20 CHRONIC ATRIAL FIBRILLATION: ICD-10-CM

## 2022-03-25 DIAGNOSIS — R06.02 SHORTNESS OF BREATH: ICD-10-CM

## 2022-03-25 DIAGNOSIS — I42.0 DILATED CARDIOMYOPATHY: Primary | ICD-10-CM

## 2022-03-25 DIAGNOSIS — I42.0 DILATED CARDIOMYOPATHY: ICD-10-CM

## 2022-03-25 DIAGNOSIS — Z95.810 ICD (IMPLANTABLE CARDIOVERTER-DEFIBRILLATOR) IN PLACE: ICD-10-CM

## 2022-03-25 PROCEDURE — 93284 PRGRMG EVAL IMPLANTABLE DFB: CPT | Performed by: INTERNAL MEDICINE

## 2022-03-25 PROCEDURE — 99213 OFFICE O/P EST LOW 20 MIN: CPT | Performed by: PHYSICIAN ASSISTANT

## 2022-03-25 PROCEDURE — 93000 ELECTROCARDIOGRAM COMPLETE: CPT | Performed by: PHYSICIAN ASSISTANT

## 2022-03-25 NOTE — PROGRESS NOTES
Problem list     Subjective   Mag Wu is a 76 y.o. female     Chief Complaint   Patient presents with   • Follow-up     6 month    • Cardiomyopathy   • Atrial Fibrillation   Problem List:      1. Minor nonobstructive disease by previous caths per patient's report, inadequate data.  2. Dilated cardiomyopathy status post AICD implantation.  2.1.  Repeat echocardiogram, 5/19, suggesting an EF at 45 to 50%.  3. Atrial fibrillation on Xarelto  4. Hyperlipidemia      5. Sjogren's Syndrome       HPI  The patient presents into the clinic today for routine evaluation and follow-up.  Since last evaluation, the patient has done well.  She has chronic fatigue and dyspnea.  These are baseline symptoms.  She denies chest pain.  She has no failure nor dysrhythmic symptoms.  She is tolerating medications currently without complication.  The patient reports normotensive status when checked at home.  She had complications recently with hyponatremia.  She was seen in the ER and had repletion protocol.  She has done well since.  The patient has no further complaints otherwise and feels that she is doing well from cardiovascular standpoint.  Of note, interrogation today indicates normal function with adequate battery life.    Current Outpatient Medications on File Prior to Visit   Medication Sig Dispense Refill   • aspirin 81 MG EC tablet Take 81 mg by mouth Daily.     • butalbital-acetaminophen-caffeine (FIORICET, ESGIC) -40 MG per tablet Take 1 tablet by mouth 3 (Three) Times a Day.     • Calcium Carb-Cholecalciferol (CALCIUM 600+D3 PO) Take  by mouth Daily.     • ESTRACE VAGINAL 0.1 MG/GM vaginal cream Insert 2 g into the vagina Daily.     • folic acid (FOLVITE) 400 MCG tablet Take 400 mcg by mouth Daily.     • latanoprost (XALATAN) 0.005 % ophthalmic solution 1 drop Every Night.     • levothyroxine (SYNTHROID, LEVOTHROID) 25 MCG tablet Take 25 mcg by mouth Daily.     • lisinopril (PRINIVIL,ZESTRIL) 5 MG tablet Take  1 tablet by mouth Daily. 90 tablet 3   • metoprolol succinate XL (TOPROL-XL) 50 MG 24 hr tablet Take 1 tablet by mouth Daily. 30 tablet 11   • Multiple Vitamin (MULTIVITAMINS PO) Take  by mouth Daily.     • omeprazole (priLOSEC) 20 MG capsule Take 20 mg by mouth Daily.     • primidone (MYSOLINE) 50 MG tablet Take 50 mg by mouth Every Night.     • Probiotic Product (PROBIOTIC DAILY PO) Take  by mouth.     • SUMAtriptan (IMITREX) 50 MG tablet Take 50 mg by mouth As Needed.     • vitamin B-12 (CYANOCOBALAMIN) 1000 MCG tablet Take 1,000 mcg by mouth 2 (Two) Times a Day.     • Xarelto 20 MG tablet Take 20 mg by mouth Daily. with food.     • [DISCONTINUED] sertraline (ZOLOFT) 25 MG tablet Take 1 tablet by mouth Daily.       No current facility-administered medications on file prior to visit.       Gluten meal    Past Medical History:   Diagnosis Date   • Anemia    • Atherosclerotic heart disease of native coronary artery without angina pectoris    • Atrial fibrillation (HCC) 8/25/2016   • Celiac disease    • Dilated cardiomyopathy (HCC)    • Hyperlipidemia 8/25/2016   • Hypothyroidism    • Sjogren's disease (HCC)        Social History     Socioeconomic History   • Marital status:    Tobacco Use   • Smoking status: Never Smoker   • Smokeless tobacco: Never Used   Substance and Sexual Activity   • Alcohol use: No   • Drug use: No   • Sexual activity: Defer       Family History   Problem Relation Age of Onset   • Other Father         coronary arteriosclerosis   • Hypertension Father    • Heart disease Father    • Other Brother         cardiac pacemaker   • Hypertension Mother    • Cancer Mother        Review of Systems   Constitutional: Positive for fatigue. Negative for chills and fever.   HENT: Negative for congestion, rhinorrhea and sore throat.    Eyes: Positive for visual disturbance (glasses).   Respiratory: Negative.  Negative for chest tightness, shortness of breath and wheezing.    Cardiovascular: Negative.   "Negative for chest pain, palpitations and leg swelling.   Gastrointestinal: Negative.    Endocrine: Negative.    Genitourinary: Negative.    Musculoskeletal: Positive for arthralgias. Negative for back pain and neck pain.   Skin: Negative.  Negative for rash and wound.   Allergic/Immunologic: Negative.  Negative for environmental allergies.   Neurological: Positive for headaches. Negative for dizziness, weakness and numbness.   Hematological: Bruises/bleeds easily (bruises / bleeds).   Psychiatric/Behavioral: Negative.  Negative for sleep disturbance.       Objective   Vitals:    03/25/22 0836   BP: 106/67   BP Location: Left arm   Patient Position: Sitting   Pulse: 81   SpO2: 96%   Weight: 65.3 kg (144 lb)   Height: 175.3 cm (69\")      /67 (BP Location: Left arm, Patient Position: Sitting)   Pulse 81   Ht 175.3 cm (69\")   Wt 65.3 kg (144 lb)   SpO2 96%   BMI 21.27 kg/m²    Lab Results (most recent)     None        Physical Exam  Vitals and nursing note reviewed.   Constitutional:       General: She is not in acute distress.     Appearance: She is well-developed.   HENT:      Head: Normocephalic and atraumatic.   Eyes:      Conjunctiva/sclera: Conjunctivae normal.      Pupils: Pupils are equal, round, and reactive to light.   Neck:      Vascular: No JVD.      Trachea: No tracheal deviation.   Cardiovascular:      Rate and Rhythm: Normal rate and regular rhythm.      Heart sounds: Normal heart sounds.   Pulmonary:      Effort: Pulmonary effort is normal.      Breath sounds: Normal breath sounds.   Abdominal:      General: Bowel sounds are normal. There is no distension.      Palpations: Abdomen is soft. There is no mass.      Tenderness: There is no abdominal tenderness. There is no guarding or rebound.   Musculoskeletal:         General: No tenderness or deformity. Normal range of motion.      Cervical back: Normal range of motion and neck supple.   Skin:     General: Skin is warm and dry.      " Coloration: Skin is not pale.      Findings: No erythema or rash.   Neurological:      Mental Status: She is alert and oriented to person, place, and time.   Psychiatric:         Behavior: Behavior normal.         Thought Content: Thought content normal.         Judgment: Judgment normal.           Procedure     ECG 12 Lead    Date/Time: 3/25/2022 8:39 AM  Performed by: Dong Barr PA  Authorized by: Dong Barr PA   Comparison: compared with previous ECG from 5/22/2020  Comparison to previous ECG: Ventricular pacing without acute changes noted.                 Assessment/Plan      Diagnosis Plan   1. Dilated cardiomyopathy (HCC)     2. Chronic atrial fibrillation (HCC)     3. Shortness of breath     4. ICD (implantable cardioverter-defibrillator) in place       1.  At this time, the patient appears to be doing well from general cardiovascular standpoint.  She appears well compensated with regards to dilated cardiomyopathy.  I would like to repeat an echocardiogram to evaluate LV size and function and cardiac parameters otherwise given history of dilated cardiomyopathy.  Last echocardiogram that I have on record would have been approximately 3 years ago.    2.  ICD interrogation today indicates normal function with adequate battery life.  We will continue routine interrogations every 3 months through the clinic.    3.  Clinically, the patient has stable dyspnea.  She really has no further cardiovascular symptoms or issues.    4.  I would continue medications without change.  If echo findings are stable, we will continue to see the patient routinely through the clinic.  She will call for any complications.             Advance Care Planning   ACP discussion was held with the patient during this visit. Patient has an advance directive (not in EMR), copy requested.           Electronically signed by:

## 2022-03-25 NOTE — TELEPHONE ENCOUNTER
SAMPLES     Patient in office and was given Xarelto 20 mg     Given 4 bottles   Lot #- [id number]  Exp: 4/2024    Provider Dong CORONEL     AT [facility]

## 2022-06-24 ENCOUNTER — OFFICE VISIT (OUTPATIENT)
Dept: CARDIOLOGY | Facility: CLINIC | Age: 77
End: 2022-06-24

## 2022-06-24 ENCOUNTER — TELEPHONE (OUTPATIENT)
Dept: CARDIOLOGY | Facility: CLINIC | Age: 77
End: 2022-06-24

## 2022-06-24 ENCOUNTER — HOSPITAL ENCOUNTER (OUTPATIENT)
Dept: CARDIOLOGY | Facility: HOSPITAL | Age: 77
Discharge: HOME | End: 2022-06-24
Admitting: PHYSICIAN ASSISTANT

## 2022-06-24 DIAGNOSIS — R06.02 SHORTNESS OF BREATH: ICD-10-CM

## 2022-06-24 DIAGNOSIS — I48.91 ATRIAL FIBRILLATION, UNSPECIFIED TYPE: Primary | ICD-10-CM

## 2022-06-24 DIAGNOSIS — Z95.810 ICD (IMPLANTABLE CARDIOVERTER-DEFIBRILLATOR) IN PLACE: ICD-10-CM

## 2022-06-24 DIAGNOSIS — I42.0 DILATED CARDIOMYOPATHY: ICD-10-CM

## 2022-06-24 DIAGNOSIS — I48.20 CHRONIC ATRIAL FIBRILLATION: ICD-10-CM

## 2022-06-24 PROCEDURE — 93289 INTERROG DEVICE EVAL HEART: CPT | Performed by: INTERNAL MEDICINE

## 2022-06-24 PROCEDURE — 93306 TTE W/DOPPLER COMPLETE: CPT

## 2022-06-24 PROCEDURE — 93306 TTE W/DOPPLER COMPLETE: CPT | Performed by: INTERNAL MEDICINE

## 2022-06-24 NOTE — TELEPHONE ENCOUNTER
SAMPLES    Patient picked up Xarelto 20mg on 06/24/22.    Lot:[id number]    Exp:11/2024    Provider Dong Barr

## 2022-06-26 LAB
AORTIC DIMENSIONLESS INDEX: 0.9 (DI)
BH CV ECHO MEAS - ACS: 1.87 CM
BH CV ECHO MEAS - AI P1/2T: 680.4 MSEC
BH CV ECHO MEAS - AO MAX PG: 3.1 MMHG
BH CV ECHO MEAS - AO MEAN PG: 1.68 MMHG
BH CV ECHO MEAS - AO ROOT DIAM: 3.2 CM
BH CV ECHO MEAS - AO V2 MAX: 88.2 CM/SEC
BH CV ECHO MEAS - AO V2 VTI: 19.3 CM
BH CV ECHO MEAS - AVA(I,D): 2.43 CM2
BH CV ECHO MEAS - EDV(CUBED): 63 ML
BH CV ECHO MEAS - EDV(MOD-SP4): 48.6 ML
BH CV ECHO MEAS - EF(MOD-SP4): 44.7 %
BH CV ECHO MEAS - ESV(CUBED): 31.9 ML
BH CV ECHO MEAS - ESV(MOD-SP4): 26.9 ML
BH CV ECHO MEAS - FS: 20.4 %
BH CV ECHO MEAS - IVS/LVPW: 1.02 CM
BH CV ECHO MEAS - IVSD: 1.23 CM
BH CV ECHO MEAS - LA A2CS (ATRIAL LENGTH): 7.4 CM
BH CV ECHO MEAS - LA A4C LENGTH: 6 CM
BH CV ECHO MEAS - LA DIMENSION: 4.6 CM
BH CV ECHO MEAS - LAT PEAK E' VEL: 12.1 CM/SEC
BH CV ECHO MEAS - LV DIASTOLIC VOL/BSA (35-75): 27.1 CM2
BH CV ECHO MEAS - LV MASS(C)D: 168.3 GRAMS
BH CV ECHO MEAS - LV MAX PG: 2.49 MMHG
BH CV ECHO MEAS - LV MEAN PG: 1.2 MMHG
BH CV ECHO MEAS - LV SYSTOLIC VOL/BSA (12-30): 15 CM2
BH CV ECHO MEAS - LV V1 MAX: 78.8 CM/SEC
BH CV ECHO MEAS - LV V1 VTI: 15.3 CM
BH CV ECHO MEAS - LVIDD: 4 CM
BH CV ECHO MEAS - LVIDS: 3.2 CM
BH CV ECHO MEAS - LVOT AREA: 3.1 CM2
BH CV ECHO MEAS - LVOT DIAM: 1.98 CM
BH CV ECHO MEAS - LVPWD: 1.21 CM
BH CV ECHO MEAS - MED PEAK E' VEL: 7 CM/SEC
BH CV ECHO MEAS - MR MAX PG: 66.7 MMHG
BH CV ECHO MEAS - MR MAX VEL: 406.2 CM/SEC
BH CV ECHO MEAS - MV DEC SLOPE: 304.5 CM/SEC2
BH CV ECHO MEAS - MV E MAX VEL: 81 CM/SEC
BH CV ECHO MEAS - MV MAX PG: 2.7 MMHG
BH CV ECHO MEAS - MV MEAN PG: 1.17 MMHG
BH CV ECHO MEAS - MV P1/2T: 74.4 MSEC
BH CV ECHO MEAS - MV V2 VTI: 19.6 CM
BH CV ECHO MEAS - MVA(P1/2T): 3 CM2
BH CV ECHO MEAS - MVA(VTI): 2.39 CM2
BH CV ECHO MEAS - PA V2 MAX: 71.5 CM/SEC
BH CV ECHO MEAS - RAP SYSTOLE: 10 MMHG
BH CV ECHO MEAS - RV MAX PG: 1.34 MMHG
BH CV ECHO MEAS - RV V1 MAX: 57.9 CM/SEC
BH CV ECHO MEAS - RV V1 VTI: 10.6 CM
BH CV ECHO MEAS - RVDD: 3.3 CM
BH CV ECHO MEAS - RVSP: 37.5 MMHG
BH CV ECHO MEAS - SI(MOD-SP4): 12.1 ML/M2
BH CV ECHO MEAS - SV(LVOT): 46.8 ML
BH CV ECHO MEAS - SV(MOD-SP4): 21.7 ML
BH CV ECHO MEAS - TAPSE (>1.6): 1.54 CM
BH CV ECHO MEAS - TR MAX PG: 27.5 MMHG
BH CV ECHO MEAS - TR MAX VEL: 262 CM/SEC
BH CV ECHO MEASUREMENTS AVERAGE E/E' RATIO: 8.48
LEFT ATRIUM VOLUME INDEX: 52.6 ML/M2
LEFT ATRIUM VOLUME: 94.7 ML
MAXIMAL PREDICTED HEART RATE: 143 BPM
STRESS TARGET HR: 122 BPM

## 2022-06-27 ENCOUNTER — TELEPHONE (OUTPATIENT)
Dept: CARDIOLOGY | Facility: CLINIC | Age: 77
End: 2022-06-27

## 2022-06-27 NOTE — TELEPHONE ENCOUNTER
Spoke to patient on echo     She verbalized understanding     AT [facility]         ----- Message from BERNA Villa sent at 6/27/2022  8:52 AM EDT -----  EF has improved further.  Stable findings otherwise.  Routine follow-up.

## 2022-09-23 ENCOUNTER — OFFICE VISIT (OUTPATIENT)
Dept: CARDIOLOGY | Facility: CLINIC | Age: 77
End: 2022-09-23

## 2022-09-23 VITALS
DIASTOLIC BLOOD PRESSURE: 72 MMHG | SYSTOLIC BLOOD PRESSURE: 110 MMHG | BODY MASS INDEX: 20.71 KG/M2 | WEIGHT: 139.8 LBS | OXYGEN SATURATION: 98 % | HEART RATE: 76 BPM | HEIGHT: 69 IN

## 2022-09-23 DIAGNOSIS — I48.91 ATRIAL FIBRILLATION, UNSPECIFIED TYPE: ICD-10-CM

## 2022-09-23 DIAGNOSIS — Z95.810 ICD (IMPLANTABLE CARDIOVERTER-DEFIBRILLATOR) IN PLACE: ICD-10-CM

## 2022-09-23 DIAGNOSIS — I42.0 DILATED CARDIOMYOPATHY: ICD-10-CM

## 2022-09-23 DIAGNOSIS — I42.0 DILATED CARDIOMYOPATHY: Primary | ICD-10-CM

## 2022-09-23 DIAGNOSIS — I48.20 CHRONIC ATRIAL FIBRILLATION: ICD-10-CM

## 2022-09-23 PROCEDURE — 99213 OFFICE O/P EST LOW 20 MIN: CPT | Performed by: PHYSICIAN ASSISTANT

## 2022-09-23 PROCEDURE — 93284 PRGRMG EVAL IMPLANTABLE DFB: CPT | Performed by: INTERNAL MEDICINE

## 2022-09-23 RX ORDER — METOPROLOL SUCCINATE 50 MG/1
50 TABLET, EXTENDED RELEASE ORAL DAILY
Qty: 90 TABLET | Refills: 3 | Status: SHIPPED | OUTPATIENT
Start: 2022-09-23

## 2022-09-23 RX ORDER — SERTRALINE HYDROCHLORIDE 25 MG/1
25 TABLET, FILM COATED ORAL DAILY
COMMUNITY

## 2022-09-23 NOTE — PROGRESS NOTES
Problem list     Subjective   Mag Wu is a 76 y.o. female     Chief Complaint   Patient presents with   • Follow-up     6 month   • Cardiomyopathy   • Palpitations   • Fatigue   Problem List:      1. Minor nonobstructive disease by previous caths per patient's report, inadequate data.  2. Dilated cardiomyopathy status post AICD implantation.  2.1.  Repeat echocardiogram, 5/19, suggesting an EF at 45 to 50%.  2.2.  Repeat echocardiogram, 6/2022, again supporting an EF of 45 to 50%.  3. Atrial fibrillation on Xarelto  4. Hyperlipidemia      5. Sjogren's Syndrome    HPI  The patient presents back to the clinic today for follow-up and for interrogation of her device.  For the most part, the patient is done well from general cardiovascular standpoint.  She currently denies chest pain.  She has no dysrhythmic symptoms.  She never senses A. fib at this time.  She has no failure symptoms.  Blood pressures appear to be well controlled.  She is tolerating medications without complication.  Of note, the patient did have an interrogation today which indicates normal device function with adequate battery life.  The patient has no further complaints otherwise and feels that she is doing well.    Current Outpatient Medications on File Prior to Visit   Medication Sig Dispense Refill   • aspirin 81 MG EC tablet Take 81 mg by mouth Daily.     • butalbital-acetaminophen-caffeine (FIORICET, ESGIC) -40 MG per tablet Take 1 tablet by mouth 3 (Three) Times a Day.     • Calcium Carb-Cholecalciferol (CALCIUM 600+D3 PO) Take  by mouth Daily.     • folic acid (FOLVITE) 400 MCG tablet Take 400 mcg by mouth Daily.     • latanoprost (XALATAN) 0.005 % ophthalmic solution 1 drop Every Night.     • levothyroxine (SYNTHROID, LEVOTHROID) 25 MCG tablet Take 25 mcg by mouth Daily.     • lisinopril (PRINIVIL,ZESTRIL) 5 MG tablet Take 1 tablet by mouth Daily. 90 tablet 3   • Multiple Vitamin (MULTIVITAMINS PO) Take  by mouth Daily.     •  omeprazole (priLOSEC) 20 MG capsule Take 20 mg by mouth Daily.     • primidone (MYSOLINE) 50 MG tablet Take 50 mg by mouth Every Night.     • Probiotic Product (PROBIOTIC DAILY PO) Take  by mouth.     • sertraline (ZOLOFT) 25 MG tablet Take 25 mg by mouth Daily.     • SUMAtriptan (IMITREX) 50 MG tablet Take 50 mg by mouth As Needed.     • Xarelto 20 MG tablet Take 20 mg by mouth Daily. with food.     • [DISCONTINUED] metoprolol succinate XL (TOPROL-XL) 50 MG 24 hr tablet Take 1 tablet by mouth Daily. 30 tablet 11   • ESTRACE VAGINAL 0.1 MG/GM vaginal cream Insert 2 g into the vagina Daily.     • vitamin B-12 (CYANOCOBALAMIN) 1000 MCG tablet Take 1,000 mcg by mouth 2 (Two) Times a Day.       No current facility-administered medications on file prior to visit.       Gluten meal    Past Medical History:   Diagnosis Date   • Anemia    • Atherosclerotic heart disease of native coronary artery without angina pectoris    • Atrial fibrillation (HCC) 8/25/2016   • Celiac disease    • Dilated cardiomyopathy (HCC)    • Hyperlipidemia 8/25/2016   • Hypothyroidism    • Sjogren's disease (HCC)        Social History     Socioeconomic History   • Marital status:    Tobacco Use   • Smoking status: Never Smoker   • Smokeless tobacco: Never Used   Vaping Use   • Vaping Use: Never used   Substance and Sexual Activity   • Alcohol use: No   • Drug use: No   • Sexual activity: Defer       Family History   Problem Relation Age of Onset   • Other Father         coronary arteriosclerosis   • Hypertension Father    • Heart disease Father    • Other Brother         cardiac pacemaker   • Hypertension Mother    • Cancer Mother        Review of Systems   Constitutional: Negative for activity change, appetite change, chills, fatigue, fever and unexpected weight change.   HENT: Negative.  Negative for congestion, dental problem, ear pain, facial swelling, hearing loss, sinus pressure, sinus pain, sneezing and sore throat.    Eyes: Negative.   "Negative for pain, discharge, redness, itching and visual disturbance.   Respiratory: Negative.  Negative for cough, chest tightness, shortness of breath and wheezing.    Cardiovascular: Positive for palpitations. Negative for chest pain and leg swelling.   Gastrointestinal: Negative.  Negative for abdominal pain, blood in stool, constipation, diarrhea, nausea, rectal pain and vomiting.   Endocrine: Negative.    Genitourinary: Negative.  Negative for hematuria.   Musculoskeletal: Negative.  Negative for back pain, joint swelling, neck pain and neck stiffness.   Skin: Negative.    Allergic/Immunologic: Negative.    Neurological: Positive for weakness. Negative for dizziness, seizures, syncope, numbness and headaches.   Hematological: Negative.    Psychiatric/Behavioral: Positive for sleep disturbance (Pt states some nights she can sleep for 6+ hours and some nights she only sleeps for 2/3 hours).       Objective   Vitals:    09/23/22 1122   BP: 110/72   BP Location: Left arm   Patient Position: Sitting   Cuff Size: Adult   Pulse: 76   SpO2: 98%   Weight: 63.4 kg (139 lb 12.8 oz)   Height: 175.3 cm (69\")      /72 (BP Location: Left arm, Patient Position: Sitting, Cuff Size: Adult)   Pulse 76   Ht 175.3 cm (69\")   Wt 63.4 kg (139 lb 12.8 oz)   SpO2 98%   BMI 20.64 kg/m²    Lab Results (most recent)     None        Physical Exam  Vitals and nursing note reviewed.   Constitutional:       General: She is not in acute distress.     Appearance: She is well-developed.   HENT:      Head: Normocephalic and atraumatic.   Eyes:      Conjunctiva/sclera: Conjunctivae normal.      Pupils: Pupils are equal, round, and reactive to light.   Neck:      Vascular: No JVD.      Trachea: No tracheal deviation.   Cardiovascular:      Rate and Rhythm: Normal rate and regular rhythm.      Heart sounds: Normal heart sounds.   Pulmonary:      Effort: Pulmonary effort is normal.      Breath sounds: Normal breath sounds.   Abdominal: "      General: Bowel sounds are normal. There is no distension.      Palpations: Abdomen is soft. There is no mass.      Tenderness: There is no abdominal tenderness. There is no guarding or rebound.   Musculoskeletal:         General: No tenderness or deformity. Normal range of motion.      Cervical back: Normal range of motion and neck supple.   Skin:     General: Skin is warm and dry.      Coloration: Skin is not pale.      Findings: No erythema or rash.   Neurological:      Mental Status: She is alert and oriented to person, place, and time.   Psychiatric:         Behavior: Behavior normal.         Thought Content: Thought content normal.         Judgment: Judgment normal.           Procedure   Procedures       Assessment & Plan      Diagnosis Plan   1. Dilated cardiomyopathy (HCC)     2. Chronic atrial fibrillation (HCC)     3. ICD (implantable cardioverter-defibrillator) in place       1.  At this time, the patient appears to be doing fairly well.  She appears well compensated with regards to the mild dilated cardiomyopathy.  Most recent echo supports only mildly depressed systolic function with an estimated EF at 45 to 50%.  She had stable parameters otherwise.  We have reviewed this in detail with her.    2.  A. fib continues to be well controlled and treated with rate control and anticoagulation therapy.  We will continue that without change.  We are attempting to obtain recent labs to ensure appropriate dosing of Xarelto.    3.  ICD interrogation today indicates normal function with adequate battery life.  We will continue interrogations every 3 months through the clinic.  We will see her on 6-month intervals.  Nothing further for now unless clinical course should change.                 BMI is within normal parameters. No other follow-up for BMI required.             Electronically signed by:

## 2023-01-27 ENCOUNTER — OFFICE VISIT (OUTPATIENT)
Dept: CARDIOLOGY | Facility: CLINIC | Age: 78
End: 2023-01-27
Payer: MEDICARE

## 2023-01-27 DIAGNOSIS — I48.91 ATRIAL FIBRILLATION, UNSPECIFIED TYPE: ICD-10-CM

## 2023-01-27 DIAGNOSIS — I42.0 DILATED CARDIOMYOPATHY: ICD-10-CM

## 2023-01-27 PROCEDURE — 93284 PRGRMG EVAL IMPLANTABLE DFB: CPT | Performed by: INTERNAL MEDICINE

## 2023-02-24 PROCEDURE — 93321 DOPPLER ECHO F-UP/LMTD STD: CPT | Performed by: INTERNAL MEDICINE

## 2023-02-24 PROCEDURE — 93308 TTE F-UP OR LMTD: CPT | Performed by: INTERNAL MEDICINE

## 2023-02-24 PROCEDURE — 93325 DOPPLER ECHO COLOR FLOW MAPG: CPT | Performed by: INTERNAL MEDICINE

## 2023-02-28 ENCOUNTER — OUTSIDE FACILITY SERVICE (OUTPATIENT)
Dept: CARDIOLOGY | Facility: CLINIC | Age: 78
End: 2023-02-28
Payer: MEDICARE

## 2023-04-28 ENCOUNTER — OFFICE VISIT (OUTPATIENT)
Dept: CARDIOLOGY | Facility: CLINIC | Age: 78
End: 2023-04-28
Payer: MEDICARE

## 2023-04-28 ENCOUNTER — TELEPHONE (OUTPATIENT)
Dept: CARDIOLOGY | Facility: CLINIC | Age: 78
End: 2023-04-28
Payer: MEDICARE

## 2023-04-28 VITALS
DIASTOLIC BLOOD PRESSURE: 66 MMHG | HEART RATE: 81 BPM | OXYGEN SATURATION: 99 % | HEIGHT: 69 IN | WEIGHT: 139.6 LBS | BODY MASS INDEX: 20.68 KG/M2 | SYSTOLIC BLOOD PRESSURE: 104 MMHG

## 2023-04-28 DIAGNOSIS — I48.91 ATRIAL FIBRILLATION, UNSPECIFIED TYPE: ICD-10-CM

## 2023-04-28 DIAGNOSIS — I42.0 DILATED CARDIOMYOPATHY: ICD-10-CM

## 2023-04-28 DIAGNOSIS — I42.0 DILATED CARDIOMYOPATHY: Primary | ICD-10-CM

## 2023-04-28 DIAGNOSIS — R06.02 SHORTNESS OF BREATH: ICD-10-CM

## 2023-04-28 PROCEDURE — 1159F MED LIST DOCD IN RCRD: CPT | Performed by: PHYSICIAN ASSISTANT

## 2023-04-28 PROCEDURE — 99213 OFFICE O/P EST LOW 20 MIN: CPT | Performed by: PHYSICIAN ASSISTANT

## 2023-04-28 PROCEDURE — 1160F RVW MEDS BY RX/DR IN RCRD: CPT | Performed by: PHYSICIAN ASSISTANT

## 2023-04-28 RX ORDER — SACUBITRIL AND VALSARTAN 24; 26 MG/1; MG/1
1 TABLET, FILM COATED ORAL 2 TIMES DAILY
COMMUNITY

## 2023-04-28 NOTE — TELEPHONE ENCOUNTER
Pt picked up while in office   xarelto 20  Lot#[id number]  Expir-10/24  x2 bottles    Entresto 24-26  Lot#[id number]  Expir-08/25  x1 bottle  Sadie Leger MA

## 2023-04-28 NOTE — PROGRESS NOTES
Problem list     Subjective   Mag Wu is a 77 y.o. female     Chief Complaint   Patient presents with   • Follow-up     6mth   Problem List:      1. Minor nonobstructive disease by previous caths per patient's report, inadequate data.  2. Dilated cardiomyopathy status post AICD implantation.  2.1.  Repeat echocardiogram, 5/19, suggesting an EF at 45 to 50%.  2.2.  Repeat echocardiogram, 6/2022 and 2/2023, again supporting an EF of 45 to 50%.  3. Atrial fibrillation on Xarelto  4. Hyperlipidemia      5. Sjogren's Syndrome    HPI  The patient presents to the clinic today for evaluation.  She presents also for device interrogation.  Her device interrogation indicates normal device function with approximately 2 years battery life remaining.  Clinically, the patient is done fairly well since last evaluation.  She was discontinued off lisinopril in favor of Entresto.  She does not feel that she is going to be able to afford this.  She tells me she may have to switch back to lisinopril.  I told her to call us should that be indicated.  Otherwise, she denies PND nor orthopnea.  Dyspnea and fatigue remain at baseline.  She has no chest pain.  She denies dysrhythmic symptoms.  She appears to be tolerating the Xarelto without bleeding complications.  She has no further complaints    Current Outpatient Medications on File Prior to Visit   Medication Sig Dispense Refill   • aspirin 81 MG EC tablet Take 1 tablet by mouth Daily.     • butalbital-acetaminophen-caffeine (FIORICET, ESGIC) -40 MG per tablet Take 1 tablet by mouth 3 (Three) Times a Day.     • Calcium Carb-Cholecalciferol (CALCIUM 600+D3 PO) Take  by mouth Daily.     • ESTRACE VAGINAL 0.1 MG/GM vaginal cream Insert 2 g into the vagina Daily.     • folic acid (FOLVITE) 400 MCG tablet Take 1 tablet by mouth Daily.     • latanoprost (XALATAN) 0.005 % ophthalmic solution 1 drop Every Night.     • levothyroxine (SYNTHROID, LEVOTHROID) 25 MCG tablet Take 1  tablet by mouth Daily.     • lisinopril (PRINIVIL,ZESTRIL) 5 MG tablet Take 1 tablet by mouth Daily. 90 tablet 3   • metoprolol succinate XL (TOPROL-XL) 50 MG 24 hr tablet Take 1 tablet by mouth Daily. 90 tablet 3   • Multiple Vitamin (MULTIVITAMINS PO) Take  by mouth Daily.     • Nitrofurantoin Monohyd Macro (MACROBID PO) Take  by mouth. X6 months per PCP     • omeprazole (priLOSEC) 20 MG capsule Take 1 capsule by mouth Daily.     • primidone (MYSOLINE) 50 MG tablet Take 1 tablet by mouth Every Night.     • Probiotic Product (PROBIOTIC DAILY PO) Take  by mouth.     • sacubitril-valsartan (Entresto) 24-26 MG tablet Take 1 tablet by mouth 2 (Two) Times a Day. Pt taking 1/2 tab BID per PCP     • sertraline (ZOLOFT) 25 MG tablet Take 1 tablet by mouth Daily.     • SUMAtriptan (IMITREX) 50 MG tablet Take 1 tablet by mouth As Needed.     • vitamin B-12 (CYANOCOBALAMIN) 1000 MCG tablet Take 1 tablet by mouth 2 (Two) Times a Day.     • Xarelto 20 MG tablet Take 1 tablet by mouth Daily. with food.       No current facility-administered medications on file prior to visit.       Gluten meal    Past Medical History:   Diagnosis Date   • Anemia    • Atherosclerotic heart disease of native coronary artery without angina pectoris    • Atrial fibrillation 8/25/2016   • Celiac disease    • Dilated cardiomyopathy    • Hyperlipidemia 8/25/2016   • Hypothyroidism    • Sjogren's disease        Social History     Socioeconomic History   • Marital status:    Tobacco Use   • Smoking status: Never   • Smokeless tobacco: Never   Vaping Use   • Vaping Use: Never used   Substance and Sexual Activity   • Alcohol use: No   • Drug use: No   • Sexual activity: Defer       Family History   Problem Relation Age of Onset   • Other Father         coronary arteriosclerosis   • Hypertension Father    • Heart disease Father    • Other Brother         cardiac pacemaker   • Hypertension Mother    • Cancer Mother        Review of Systems  "  Constitutional: Positive for fatigue.   HENT: Negative for congestion, rhinorrhea and sore throat.    Eyes: Positive for visual disturbance (Glasses daily ).   Respiratory: Negative for chest tightness and shortness of breath.    Cardiovascular: Negative for chest pain, palpitations and leg swelling.   Genitourinary:        Recurrent UTI- being addressed w/   PCP w/ Macrobid x6mth      Neurological: Positive for numbness (Feet go to sleep, then she falls ) and headaches (Always- unchanged since last visit ). Negative for dizziness, syncope, weakness and light-headedness.   Hematological: Bruises/bleeds easily.   Psychiatric/Behavioral: Negative for sleep disturbance.       Objective   Vitals:    04/28/23 1125   BP: 104/66   Pulse: 81   SpO2: 99%   Weight: 63.3 kg (139 lb 9.6 oz)   Height: 175.3 cm (69\")      /66   Pulse 81   Ht 175.3 cm (69\")   Wt 63.3 kg (139 lb 9.6 oz)   SpO2 99%   BMI 20.62 kg/m²    Lab Results (most recent)     None        Physical Exam  Vitals and nursing note reviewed.   Constitutional:       General: She is not in acute distress.     Appearance: She is well-developed.   HENT:      Head: Normocephalic and atraumatic.   Eyes:      Conjunctiva/sclera: Conjunctivae normal.      Pupils: Pupils are equal, round, and reactive to light.   Neck:      Vascular: No JVD.      Trachea: No tracheal deviation.   Cardiovascular:      Rate and Rhythm: Normal rate and regular rhythm.      Heart sounds: Normal heart sounds.   Pulmonary:      Effort: Pulmonary effort is normal.      Breath sounds: Normal breath sounds.   Abdominal:      General: Bowel sounds are normal. There is no distension.      Palpations: Abdomen is soft. There is no mass.      Tenderness: There is no abdominal tenderness. There is no guarding or rebound.   Musculoskeletal:         General: No tenderness or deformity. Normal range of motion.      Cervical back: Normal range of motion and neck supple.   Skin:     General: Skin " is warm and dry.      Coloration: Skin is not pale.      Findings: No erythema or rash.   Neurological:      Mental Status: She is alert and oriented to person, place, and time.   Psychiatric:         Behavior: Behavior normal.         Thought Content: Thought content normal.         Judgment: Judgment normal.           Procedure   Procedures       Assessment & Plan      Diagnosis Plan   1. Dilated cardiomyopathy        2. Atrial fibrillation, unspecified type        3. Shortness of breath        1.  At this time, the patient appears to be doing well.  Recent echo again supports stable systolic function, EF 45 to 50%.  This is unchanged from prior echo.  Parameters otherwise were unremarkable by echo.    2.  Interrogation today indicates normal device function with adequate battery life.  We will continue that routinely through the clinic.    3.  A-fib is well maintained with rate control and anticoagulation.  I would make no adjustments in medications.    4.  As the patient is doing well, nothing further and we will see her on 6-month intervals.          Advance Care Planning   ACP discussion was declined by the patient. Patient has an advance directive (not in EMR), copy requested.             Electronically signed by:

## 2023-05-15 ENCOUNTER — TELEPHONE (OUTPATIENT)
Dept: CARDIOLOGY | Facility: CLINIC | Age: 78
End: 2023-05-15
Payer: MEDICARE

## 2023-05-15 NOTE — TELEPHONE ENCOUNTER
"Pt LVM on triage stating that she was told she's receive a \"monitor to keep pacer and defibrillator checked\" but has not received it.     Per chart review, we have not ordered a monitor for pt. Last pacer check was 4/28/23 w/ Cynthia.       I called Cynthia, she stated she spoke w/ pt regd home monitoring. She stated that she would check w/ the girls in [place] for an update for pt. Stated she would get back and touch and let us know.       I called pt, made her aware of the above, she verbalized understanding.   "

## 2023-05-17 NOTE — TELEPHONE ENCOUNTER
Cynthia called back, stated that there was an issue in the system, which was the delay. Cynthia stated she will call her to see if someone can P/U device from our office and that she will drop it off.

## 2023-07-28 ENCOUNTER — OFFICE VISIT (OUTPATIENT)
Dept: CARDIOLOGY | Facility: CLINIC | Age: 78
End: 2023-07-28
Payer: MEDICARE

## 2023-07-28 DIAGNOSIS — I48.91 ATRIAL FIBRILLATION, UNSPECIFIED TYPE: Primary | ICD-10-CM

## 2023-07-28 DIAGNOSIS — I42.0 DILATED CARDIOMYOPATHY: ICD-10-CM

## 2023-07-28 DIAGNOSIS — Z95.810 ICD (IMPLANTABLE CARDIOVERTER-DEFIBRILLATOR) IN PLACE: ICD-10-CM

## 2023-07-28 PROCEDURE — 93289 INTERROG DEVICE EVAL HEART: CPT | Performed by: INTERNAL MEDICINE

## 2023-08-19 PROCEDURE — 93296 REM INTERROG EVL PM/IDS: CPT | Performed by: INTERNAL MEDICINE

## 2023-08-19 PROCEDURE — 93295 DEV INTERROG REMOTE 1/2/MLT: CPT | Performed by: INTERNAL MEDICINE

## 2024-01-26 ENCOUNTER — OFFICE VISIT (OUTPATIENT)
Dept: CARDIOLOGY | Facility: CLINIC | Age: 79
End: 2024-01-26
Payer: MEDICARE

## 2024-01-26 VITALS
OXYGEN SATURATION: 98 % | HEART RATE: 69 BPM | SYSTOLIC BLOOD PRESSURE: 118 MMHG | WEIGHT: 139 LBS | DIASTOLIC BLOOD PRESSURE: 72 MMHG | HEIGHT: 69 IN | BODY MASS INDEX: 20.59 KG/M2

## 2024-01-26 DIAGNOSIS — Z95.810 ICD (IMPLANTABLE CARDIOVERTER-DEFIBRILLATOR) IN PLACE: ICD-10-CM

## 2024-01-26 DIAGNOSIS — I42.0 DILATED CARDIOMYOPATHY: ICD-10-CM

## 2024-01-26 DIAGNOSIS — I48.91 ATRIAL FIBRILLATION, UNSPECIFIED TYPE: Primary | ICD-10-CM

## 2024-01-26 PROCEDURE — 99213 OFFICE O/P EST LOW 20 MIN: CPT | Performed by: PHYSICIAN ASSISTANT

## 2024-01-26 PROCEDURE — 93000 ELECTROCARDIOGRAM COMPLETE: CPT | Performed by: PHYSICIAN ASSISTANT

## 2024-01-26 PROCEDURE — 1160F RVW MEDS BY RX/DR IN RCRD: CPT | Performed by: PHYSICIAN ASSISTANT

## 2024-01-26 PROCEDURE — 1159F MED LIST DOCD IN RCRD: CPT | Performed by: PHYSICIAN ASSISTANT

## 2024-01-26 RX ORDER — PRIMIDONE 250 MG/1
125 TABLET ORAL NIGHTLY
COMMUNITY

## 2024-01-26 RX ORDER — OMEPRAZOLE 40 MG/1
40 CAPSULE, DELAYED RELEASE ORAL DAILY
COMMUNITY

## 2024-01-26 NOTE — PROGRESS NOTES
Problem list     Subjective   Mag Wu is a 78 y.o. female     Chief Complaint   Patient presents with    Follow-up     6 month follow up and pace maker check   Problem List:      1. Minor nonobstructive disease by previous caths per patient's report, inadequate data.  2. Dilated cardiomyopathy status post AICD implantation.  2.1.  Repeat echocardiogram, 5/19, suggesting an EF at 45 to 50%.  2.2.  Repeat echocardiogram, 6/2022 and 2/2023, again supporting an EF of 45 to 50%.  3. Atrial fibrillation on Xarelto  4. Hyperlipidemia      5. Sjogren's Syndrome    HPI    The patient presents to clinic today for routine evaluation and follow-up.  Since last evaluation here, the patient is continued to do well from cardiovascular standpoint.  She has had no symptoms of A-fib.  She reports no chest pain currently.  Dyspnea is at baseline.  She denies failure symptoms.  She did have an echocardiogram in June, 2022, which indicated known cardiomyopathy, EF at 45±5%.  Cardiac parameters were stable otherwise.  Also of note, she had an interrogation today which indicates normal device function.  She has approximately 1 year battery life remaining.  She has no further complaints.  Current Outpatient Medications on File Prior to Visit   Medication Sig Dispense Refill    aspirin 81 MG EC tablet Take 1 tablet by mouth Daily.      butalbital-acetaminophen-caffeine (FIORICET, ESGIC) -40 MG per tablet Take 1 tablet by mouth 3 (Three) Times a Day.      Calcium Carb-Cholecalciferol (CALCIUM 600+D3 PO) Take  by mouth Daily.      ESTRACE VAGINAL 0.1 MG/GM vaginal cream Insert 2 applicators into the vagina Daily.      folic acid (FOLVITE) 400 MCG tablet Take 1 tablet by mouth Daily.      latanoprost (XALATAN) 0.005 % ophthalmic solution 1 drop Every Night.      levothyroxine (SYNTHROID, LEVOTHROID) 25 MCG tablet Take 1 tablet by mouth Daily.      lisinopril (PRINIVIL,ZESTRIL) 5 MG tablet Take 1 tablet by mouth Daily. 90  tablet 3    metoprolol succinate XL (TOPROL-XL) 50 MG 24 hr tablet Take 1 tablet by mouth Daily. 90 tablet 3    Multiple Vitamin (MULTIVITAMINS PO) Take  by mouth Daily.      omeprazole (priLOSEC) 40 MG capsule Take 1 capsule by mouth Daily.      primidone (MYSOLINE) 250 MG tablet Take 0.5 tablets by mouth Every Night.      Probiotic Product (PROBIOTIC DAILY PO) Take  by mouth.      sertraline (ZOLOFT) 25 MG tablet Take 2 tablets by mouth Daily.      SUMAtriptan (IMITREX) 50 MG tablet Take 1 tablet by mouth As Needed.      vitamin B-12 (CYANOCOBALAMIN) 1000 MCG tablet Take 1 tablet by mouth 2 (Two) Times a Day.      Xarelto 20 MG tablet Take 1 tablet by mouth Daily. with food.      [DISCONTINUED] Nitrofurantoin Monohyd Macro (MACROBID PO) Take  by mouth. X6 months per PCP      [DISCONTINUED] omeprazole (priLOSEC) 20 MG capsule Take 1 capsule by mouth Daily.      [DISCONTINUED] primidone (MYSOLINE) 50 MG tablet Take 1 tablet by mouth Every Night.      [DISCONTINUED] sacubitril-valsartan (Entresto) 24-26 MG tablet Take 1 tablet by mouth 2 (Two) Times a Day. Pt taking 1/2 tab BID per PCP       No current facility-administered medications on file prior to visit.       Gluten meal    Past Medical History:   Diagnosis Date    Anemia     Atherosclerotic heart disease of native coronary artery without angina pectoris     Atrial fibrillation 8/25/2016    Celiac disease     Dilated cardiomyopathy     Hyperlipidemia 8/25/2016    Hypothyroidism     Sjogren's disease        Social History     Socioeconomic History    Marital status:    Tobacco Use    Smoking status: Never    Smokeless tobacco: Never   Vaping Use    Vaping Use: Never used   Substance and Sexual Activity    Alcohol use: No    Drug use: No    Sexual activity: Defer       Family History   Problem Relation Age of Onset    Other Father         coronary arteriosclerosis    Hypertension Father     Heart disease Father     Other Brother         cardiac pacemaker  "   Hypertension Mother     Cancer Mother        Review of Systems   Constitutional:  Positive for fatigue. Negative for chills, diaphoresis and fever.   HENT: Negative.     Eyes: Negative.  Negative for visual disturbance (wears glasses).   Respiratory: Negative.  Negative for apnea, cough, chest tightness, shortness of breath and wheezing.    Cardiovascular: Negative.  Negative for chest pain, palpitations and leg swelling.   Gastrointestinal: Negative.  Positive for nausea. Negative for abdominal pain and blood in stool.   Endocrine: Negative.    Genitourinary: Negative.  Negative for hematuria.   Musculoskeletal:  Positive for arthralgias (hands and all over). Negative for back pain, myalgias, neck pain and neck stiffness.   Skin: Negative.  Negative for rash and wound.   Allergic/Immunologic: Positive for food allergies (gluten meal). Negative for environmental allergies.   Neurological: Negative.  Positive for headaches. Negative for dizziness, syncope, weakness, light-headedness and numbness.   Hematological:  Bruises/bleeds easily (on xarelto).   Psychiatric/Behavioral: Negative.  Negative for sleep disturbance.        Objective   Vitals:    01/26/24 0940   BP: 118/72   Pulse: 69   SpO2: 98%   Weight: 63 kg (139 lb)   Height: 175.3 cm (69\")      /72   Pulse 69   Ht 175.3 cm (69\")   Wt 63 kg (139 lb)   SpO2 98%   BMI 20.53 kg/m²    Lab Results (most recent)       None          Physical Exam  Vitals and nursing note reviewed.   Constitutional:       General: She is not in acute distress.     Appearance: She is well-developed.   HENT:      Head: Normocephalic and atraumatic.   Eyes:      Conjunctiva/sclera: Conjunctivae normal.      Pupils: Pupils are equal, round, and reactive to light.   Neck:      Vascular: No JVD.      Trachea: No tracheal deviation.   Cardiovascular:      Rate and Rhythm: Normal rate and regular rhythm.      Heart sounds: Normal heart sounds.   Pulmonary:      Effort: Pulmonary " effort is normal.      Breath sounds: Normal breath sounds.   Abdominal:      General: Bowel sounds are normal. There is no distension.      Palpations: Abdomen is soft. There is no mass.      Tenderness: There is no abdominal tenderness. There is no guarding or rebound.   Musculoskeletal:         General: No tenderness or deformity. Normal range of motion.      Cervical back: Normal range of motion and neck supple.   Skin:     General: Skin is warm and dry.      Coloration: Skin is not pale.      Findings: No erythema or rash.   Neurological:      Mental Status: She is alert and oriented to person, place, and time.   Psychiatric:         Behavior: Behavior normal.         Thought Content: Thought content normal.         Judgment: Judgment normal.           Procedure     ECG 12 Lead    Date/Time: 1/26/2024 9:43 AM  Performed by: Dong Barr PA    Authorized by: Dong Barr PA  Comparison: compared with previous ECG from 3/25/2022  Comparison to previous ECG: Ventricular pacing with no acute changes noted.             Assessment & Plan      Diagnosis Plan   1. Atrial fibrillation, unspecified type        2. Dilated cardiomyopathy        3. ICD (implantable cardioverter-defibrillator) in place          1.  At this time, the patient appears stable from cardiovascular standpoint.  She has no symptoms or complications otherwise related to A-fib.  I feel she is appropriately treated for the same.  We will continue medications without change.    2.  Echo from June 2022 indicated stable findings, in particular EF at 45± percent.  This was unchanged from prior study.  I would continue cardioprotective medications for the same.    3.  Interrogation today indicates normal ICD function.  She is approaching JESSICA.  We will follow this closely through the clinic.    4.  Nothing further from general cardiovascular standpoint.  We will continue to see the patient on routine 6 to 9-month intervals.           Advance Care  Planning   ACP discussion was held with the patient during this visit. Patient has an advance directive (not in EMR), copy requested.     Electronically signed by:

## 2024-05-14 RX ORDER — METOPROLOL SUCCINATE 50 MG/1
50 TABLET, EXTENDED RELEASE ORAL DAILY
Qty: 90 TABLET | Refills: 3 | Status: SHIPPED | OUTPATIENT
Start: 2024-05-14

## 2024-07-26 ENCOUNTER — OFFICE VISIT (OUTPATIENT)
Dept: CARDIOLOGY | Facility: CLINIC | Age: 79
End: 2024-07-26
Payer: MEDICARE

## 2024-07-26 VITALS
SYSTOLIC BLOOD PRESSURE: 116 MMHG | DIASTOLIC BLOOD PRESSURE: 67 MMHG | HEART RATE: 70 BPM | WEIGHT: 130 LBS | HEIGHT: 69 IN | BODY MASS INDEX: 19.26 KG/M2 | OXYGEN SATURATION: 99 %

## 2024-07-26 DIAGNOSIS — I42.0 DILATED CARDIOMYOPATHY: Primary | ICD-10-CM

## 2024-07-26 DIAGNOSIS — I48.91 ATRIAL FIBRILLATION, UNSPECIFIED TYPE: Primary | ICD-10-CM

## 2024-07-26 DIAGNOSIS — I48.91 ATRIAL FIBRILLATION, UNSPECIFIED TYPE: ICD-10-CM

## 2024-07-26 DIAGNOSIS — Z95.810 ICD (IMPLANTABLE CARDIOVERTER-DEFIBRILLATOR) IN PLACE: ICD-10-CM

## 2024-07-26 PROCEDURE — 1160F RVW MEDS BY RX/DR IN RCRD: CPT | Performed by: PHYSICIAN ASSISTANT

## 2024-07-26 PROCEDURE — 99213 OFFICE O/P EST LOW 20 MIN: CPT | Performed by: PHYSICIAN ASSISTANT

## 2024-07-26 PROCEDURE — 1159F MED LIST DOCD IN RCRD: CPT | Performed by: PHYSICIAN ASSISTANT

## 2024-07-26 RX ORDER — SERTRALINE HYDROCHLORIDE 100 MG/1
1 TABLET, FILM COATED ORAL DAILY
COMMUNITY
Start: 2024-06-27

## 2024-07-26 RX ORDER — TRAZODONE HYDROCHLORIDE 50 MG/1
50 TABLET ORAL NIGHTLY PRN
COMMUNITY
Start: 2024-07-22

## 2024-07-26 RX ORDER — CALCIUM CARBONATE 300MG(750)
400 TABLET,CHEWABLE ORAL 2 TIMES DAILY
COMMUNITY

## 2024-07-26 NOTE — PROGRESS NOTES
Problem list     Subjective   Mag Wu is a 78 y.o. female     Chief Complaint   Patient presents with    Follow-up     6 month follow up with pacer check.    Problem List:      1. Minor nonobstructive disease by previous caths per patient's report, inadequate data.  2. Dilated cardiomyopathy status post AICD implantation.  2.1.  Repeat echocardiogram, 5/19, suggesting an EF at 45 to 50%.  2.2.  Repeat echocardiogram, 6/2022 and 2/2023, again supporting an EF of 45 to 50%.  3. Atrial fibrillation on Xarelto  4. Hyperlipidemia      5. Sjogren's Syndrome    HPI  The patient presents to the clinic today for routine evaluation and follow-up.  For the most part, she has continued to do well since last evaluation.  We did want to see her today for routine evaluation and ICD check.  ICD check indicates normal device function with approximately 9 months battery life remaining.  She has BiV device with normal functioning ventricular leads bilaterally.  Clinically, the patient has not done well.  She had influenza in February and a subsequent extended hospitalization.  She has had a slow recovery.  She is accompanied by her son today who helps give some of the history.  Strictly from cardiovascular standpoint, she denies any angina.  She has no failure nor dysrhythmic symptoms.  She feels that mostly she is doing well.    Current Outpatient Medications on File Prior to Visit   Medication Sig Dispense Refill    aspirin 81 MG EC tablet Take 1 tablet by mouth Daily.      butalbital-acetaminophen-caffeine (FIORICET, ESGIC) -40 MG per tablet Take 1 tablet by mouth 3 (Three) Times a Day.      levothyroxine (SYNTHROID, LEVOTHROID) 25 MCG tablet Take 1 tablet by mouth Daily.      Magnesium 400 MG tablet Take 400 mg by mouth 2 (Two) Times a Day.      metoprolol succinate XL (TOPROL-XL) 50 MG 24 hr tablet TAKE ONE TABLET BY MOUTH EVERY DAY 90 tablet 3    Multiple Vitamin (MULTIVITAMINS PO) Take  by mouth Daily.       omeprazole (priLOSEC) 40 MG capsule Take 1 capsule by mouth Daily.      primidone (MYSOLINE) 250 MG tablet Take 0.5 tablets by mouth 2 (Two) Times a Day.      sertraline (ZOLOFT) 100 MG tablet Take 1 tablet by mouth Daily.      SUMAtriptan (IMITREX) 50 MG tablet Take 2 tablets by mouth As Needed.      traZODone (DESYREL) 50 MG tablet Take 1 tablet by mouth At Night As Needed.      Xarelto 20 MG tablet Take 1 tablet by mouth Daily. with food.      [DISCONTINUED] Calcium Carb-Cholecalciferol (CALCIUM 600+D3 PO) Take  by mouth Daily.      [DISCONTINUED] ESTRACE VAGINAL 0.1 MG/GM vaginal cream Insert 2 g into the vagina Daily.      [DISCONTINUED] folic acid (FOLVITE) 400 MCG tablet Take 1 tablet by mouth Daily.      [DISCONTINUED] latanoprost (XALATAN) 0.005 % ophthalmic solution 1 drop Every Night.      [DISCONTINUED] lisinopril (PRINIVIL,ZESTRIL) 5 MG tablet Take 1 tablet by mouth Daily. 90 tablet 3    [DISCONTINUED] Probiotic Product (PROBIOTIC DAILY PO) Take  by mouth.      [DISCONTINUED] sertraline (ZOLOFT) 25 MG tablet Take 2 tablets by mouth Daily.      [DISCONTINUED] vitamin B-12 (CYANOCOBALAMIN) 1000 MCG tablet Take 1 tablet by mouth 2 (Two) Times a Day.       No current facility-administered medications on file prior to visit.       Gluten meal    Past Medical History:   Diagnosis Date    Anemia     Atherosclerotic heart disease of native coronary artery without angina pectoris     Atrial fibrillation 08/25/2016    Celiac disease     Dilated cardiomyopathy     Hyperlipidemia 08/25/2016    Hyponatremia 03/2024    Hypothyroidism     Sjogren's disease        Social History     Socioeconomic History    Marital status:    Tobacco Use    Smoking status: Never    Smokeless tobacco: Never   Vaping Use    Vaping status: Never Used   Substance and Sexual Activity    Alcohol use: No    Drug use: No    Sexual activity: Defer       Family History   Problem Relation Age of Onset    Other Father         coronary  "arteriosclerosis    Hypertension Father     Heart disease Father     Other Brother         cardiac pacemaker    Hypertension Mother     Cancer Mother        Review of Systems   Constitutional:  Positive for fatigue. Negative for chills, diaphoresis and fever.   Eyes: Negative.  Negative for visual disturbance.   Respiratory: Negative.  Negative for apnea, cough, chest tightness, shortness of breath and wheezing.    Cardiovascular: Negative.  Negative for chest pain, palpitations and leg swelling.   Gastrointestinal: Negative.  Negative for abdominal pain and blood in stool.   Endocrine: Negative.    Genitourinary: Negative.  Negative for hematuria.   Musculoskeletal:  Positive for arthralgias. Negative for back pain, myalgias, neck pain and neck stiffness.   Skin: Negative.  Negative for rash and wound.   Allergic/Immunologic: Positive for food allergies (gluten). Negative for environmental allergies.   Neurological:  Positive for weakness and headaches. Negative for dizziness, syncope, light-headedness and numbness.   Hematological:  Bruises/bleeds easily.   Psychiatric/Behavioral: Negative.  Negative for sleep disturbance.        Objective   Vitals:    07/26/24 0927   BP: 116/67   Pulse: 70   SpO2: 99%   Weight: 59 kg (130 lb)   Height: 175.3 cm (69\")      /67   Pulse 70   Ht 175.3 cm (69\")   Wt 59 kg (130 lb)   SpO2 99%   BMI 19.20 kg/m²    Lab Results (most recent)       None          Physical Exam  Vitals and nursing note reviewed.   Constitutional:       General: She is not in acute distress.     Appearance: She is well-developed.   HENT:      Head: Normocephalic and atraumatic.   Eyes:      Conjunctiva/sclera: Conjunctivae normal.      Pupils: Pupils are equal, round, and reactive to light.   Neck:      Vascular: No JVD.      Trachea: No tracheal deviation.   Cardiovascular:      Rate and Rhythm: Normal rate and regular rhythm.      Heart sounds: Normal heart sounds.   Pulmonary:      Effort: " Pulmonary effort is normal.      Breath sounds: Normal breath sounds.   Abdominal:      General: Bowel sounds are normal. There is no distension.      Palpations: Abdomen is soft. There is no mass.      Tenderness: There is no abdominal tenderness. There is no guarding or rebound.   Musculoskeletal:         General: No tenderness or deformity. Normal range of motion.      Cervical back: Normal range of motion and neck supple.   Skin:     General: Skin is warm and dry.      Coloration: Skin is not pale.      Findings: No erythema or rash.   Neurological:      Mental Status: She is alert and oriented to person, place, and time.   Psychiatric:         Behavior: Behavior normal.         Thought Content: Thought content normal.         Judgment: Judgment normal.           Procedure   Procedures       Assessment & Plan      Diagnosis Plan   1. Dilated cardiomyopathy        2. Atrial fibrillation, unspecified type        3. ICD (implantable cardioverter-defibrillator) in place          1.  At this time, the patient appears to be doing well from general cardiovascular standpoint.  She has generalized deconditioning status post recent hospitalization for complications related to influenza.  She has had a slow recovery in that regard.  The majority of her medications/cardioprotective regimen had to be discontinued because of hypotension.  We have not been able to reinstitute that yet.  We will follow her in the future and institute as able.    2.  Eventually, I would like to repeat an echo specifically to evaluate systolic function given history of dilated cardiomyopathy.  Will hold on that for now.    3.  She has approximately 9 months remaining on BiV ICD device.  We will recheck that in 3 months.    4.  Nothing further and we will see her on 6-month intervals otherwise.  She will return for any issues.           Advance Care Planning   ACP discussion was held with the patient during this visit. Patient has an advance  directive (not in EMR), copy requested.         Electronically signed by:

## 2024-09-18 ENCOUNTER — TELEPHONE (OUTPATIENT)
Dept: CARDIOLOGY | Facility: CLINIC | Age: 79
End: 2024-09-18
Payer: MEDICARE

## 2024-09-18 DIAGNOSIS — Z95.810 ICD (IMPLANTABLE CARDIOVERTER-DEFIBRILLATOR) IN PLACE: Primary | ICD-10-CM

## 2024-09-25 ENCOUNTER — TELEPHONE (OUTPATIENT)
Dept: CARDIOLOGY | Facility: CLINIC | Age: 79
End: 2024-09-25

## 2024-09-25 NOTE — TELEPHONE ENCOUNTER
Caller: LASHONDA DURAN    Relationship: Emergency Contact    Best call back number: [phone]    What is the best time to reach you: ANYTIME    Who are you requesting to speak with (clinical staff, provider,  specific staff member): CLINICAL    Do you know the name of the person who called: N/A    What was the call regarding: PATIENT GOT A CALL ABOUT HER APPOINTMENT FOR REPLACEMENT OF HER BATTERY IN HER PACE MAKER. SON; LASHONDA DURAN WOULD LIKE A CALL BACK TO DISCUSS DATE AND TIME OF APPOINTMENT. PLEASE REACH OUT.    Is it okay if the provider responds through MyChart:

## 2024-10-10 ENCOUNTER — TELEPHONE (OUTPATIENT)
Dept: CARDIOLOGY | Facility: CLINIC | Age: 79
End: 2024-10-10
Payer: MEDICARE

## 2024-10-10 NOTE — TELEPHONE ENCOUNTER
Called patient and explained she had replacement last week. She was unsure what the tape was for on her chest. She is not in any pain and not having any problems with the implant site. Advised she is scheduled next week to make sure site is healing well. She reported lights blinking on the box. Explained the remote box may be doing an update and blinks often when updates are being preformed. She will have a family member check with the monitoring company by calling the number on the bottom of the box to ensure working properly.

## 2024-10-10 NOTE — TELEPHONE ENCOUNTER
I spoke to the [employer] [facility] who pulled up the patient and figured out that she needs a new box. She will work on getting a new one sent to the patient, patient notified.

## 2024-10-10 NOTE — TELEPHONE ENCOUNTER
Caller: Mag Wu    Relationship: Self    Best call back number: [phone]    What is the best time to reach you: ANY    What was the call regarding: PATIENT ADVISING THE MONITOR FOR THE PACEMAKER/DEFIBRILLATOR HAS BEEN FLICKERING FOR 1 WEEK; THE COMPANY TOLD HER SHE MAY NEED A REPLACEMENT. SHE IS VERY CONCERNED PLEASE CALL HER ASAP TO DISCUSS.     Is it okay if the provider responds through MyChart: NO

## 2024-10-10 NOTE — TELEPHONE ENCOUNTER
Caller: Mag Wu    Relationship: Self    Best call back number: [phone]    What is the best time to reach you: ANY    Who are you requesting to speak with (clinical staff, provider,  specific staff member): CLINICAL        What was the call regarding: PATIENT WAS DISCUSSING THAT THEY NEED A NEW PACE MAKER STATING THAT DR. SUGGS TOLD HER TO GET A NEW DEVICE.     Is it okay if the provider responds through [employer]: PLEASE CALL

## 2024-10-15 ENCOUNTER — CLINICAL SUPPORT (OUTPATIENT)
Dept: CARDIOLOGY | Facility: CLINIC | Age: 79
End: 2024-10-15
Payer: MEDICARE

## 2024-10-15 DIAGNOSIS — I42.8 NICM (NONISCHEMIC CARDIOMYOPATHY): Primary | ICD-10-CM

## 2024-10-15 PROCEDURE — 93284 PRGRMG EVAL IMPLANTABLE DFB: CPT | Performed by: INTERNAL MEDICINE

## 2024-10-15 NOTE — PROGRESS NOTES
Mag A Jazmin  1945  10/15/2024   ?   No chief complaint on file.     ?   HPI:   ?   ? Patient presents as nurse visit for wound check s/p BIV ICD gen change on 10/3/24 by Dr Calero for nonischemic cardiomyopathy. Area still covered in 5 steri-strips with no signs of infection. Area appears to be swollen with quite a bit of bruising. Patient request Dong Barr PA-C evaluate site.   ?     Current Outpatient Medications:     aspirin 81 MG EC tablet, Take 1 tablet by mouth Daily., Disp: , Rfl:     butalbital-acetaminophen-caffeine (FIORICET, ESGIC) -40 MG per tablet, Take 1 tablet by mouth 3 (Three) Times a Day., Disp: , Rfl:     levothyroxine (SYNTHROID, LEVOTHROID) 25 MCG tablet, Take 1 tablet by mouth Daily., Disp: , Rfl:     Magnesium 400 MG tablet, Take 400 mg by mouth 2 (Two) Times a Day., Disp: , Rfl:     metoprolol succinate XL (TOPROL-XL) 50 MG 24 hr tablet, TAKE ONE TABLET BY MOUTH EVERY DAY, Disp: 90 tablet, Rfl: 3    Multiple Vitamin (MULTIVITAMINS PO), Take  by mouth Daily., Disp: , Rfl:     omeprazole (priLOSEC) 40 MG capsule, Take 1 capsule by mouth Daily., Disp: , Rfl:     primidone (MYSOLINE) 250 MG tablet, Take 0.5 tablets by mouth 2 (Two) Times a Day., Disp: , Rfl:     sertraline (ZOLOFT) 100 MG tablet, Take 1 tablet by mouth Daily., Disp: , Rfl:     SUMAtriptan (IMITREX) 50 MG tablet, Take 2 tablets by mouth As Needed., Disp: , Rfl:     traZODone (DESYREL) 50 MG tablet, Take 1 tablet by mouth At Night As Needed., Disp: , Rfl:     Xarelto 20 MG tablet, Take 1 tablet by mouth Daily. with food., Disp: , Rfl:    ?   ?   Gluten meal       Procedures     ?   Assessment & Plan    ?   ?   ?  1. Nonischemic cardiomyopathy    Dong Barr PA-C examined wound and given wound care instructions. ICD started pulsating while giving wound care instructions. ICD rep contacted and will do interrogation today.

## 2025-01-24 ENCOUNTER — OFFICE VISIT (OUTPATIENT)
Dept: CARDIOLOGY | Facility: CLINIC | Age: 80
End: 2025-01-24
Payer: MEDICARE

## 2025-01-24 DIAGNOSIS — I48.0 PAROXYSMAL ATRIAL FIBRILLATION: Primary | ICD-10-CM

## 2025-01-24 DIAGNOSIS — I42.0 DILATED CARDIOMYOPATHY: ICD-10-CM

## 2025-01-24 DIAGNOSIS — Z95.810 ICD (IMPLANTABLE CARDIOVERTER-DEFIBRILLATOR) IN PLACE: ICD-10-CM

## 2025-01-24 DIAGNOSIS — I42.8 NICM (NONISCHEMIC CARDIOMYOPATHY): ICD-10-CM

## 2025-03-27 ENCOUNTER — OFFICE VISIT (OUTPATIENT)
Dept: CARDIOLOGY | Facility: CLINIC | Age: 80
End: 2025-03-27
Payer: MEDICARE

## 2025-03-27 VITALS
WEIGHT: 139 LBS | HEIGHT: 69 IN | DIASTOLIC BLOOD PRESSURE: 75 MMHG | BODY MASS INDEX: 20.59 KG/M2 | OXYGEN SATURATION: 96 % | SYSTOLIC BLOOD PRESSURE: 122 MMHG | HEART RATE: 70 BPM

## 2025-03-27 DIAGNOSIS — I48.91 ATRIAL FIBRILLATION, UNSPECIFIED TYPE: ICD-10-CM

## 2025-03-27 DIAGNOSIS — R06.09 DYSPNEA ON EXERTION: ICD-10-CM

## 2025-03-27 DIAGNOSIS — I42.0 DILATED CARDIOMYOPATHY: Primary | ICD-10-CM

## 2025-03-27 PROCEDURE — 93000 ELECTROCARDIOGRAM COMPLETE: CPT | Performed by: PHYSICIAN ASSISTANT

## 2025-03-27 PROCEDURE — 99214 OFFICE O/P EST MOD 30 MIN: CPT | Performed by: PHYSICIAN ASSISTANT

## 2025-03-27 PROCEDURE — 1159F MED LIST DOCD IN RCRD: CPT | Performed by: PHYSICIAN ASSISTANT

## 2025-03-27 PROCEDURE — 1160F RVW MEDS BY RX/DR IN RCRD: CPT | Performed by: PHYSICIAN ASSISTANT

## 2025-03-27 RX ORDER — PROPRANOLOL HCL 20 MG
1 TABLET ORAL EVERY 12 HOURS SCHEDULED
COMMUNITY
Start: 2025-01-02

## 2025-03-27 NOTE — PROGRESS NOTES
Problem list     Subjective   Mag Wu is a 79 y.o. female     Chief Complaint   Patient presents with    Follow-up     8 month follow up    Problem List:      1. Minor nonobstructive disease by previous caths per patient's report, inadequate data.  2. Dilated cardiomyopathy status post AICD implantation.  2.1.  Repeat echocardiogram, 5/19, suggesting an EF at 45 to 50%.  2.2.  Repeat echocardiogram, 6/2022 and 2/2023, again supporting an EF of 45 to 50%.  3. Atrial fibrillation on Xarelto  4. Hyperlipidemia      5. Sjogren's Syndrome       HPI  The patient presents in clinic today for routine evaluation and follow-up.  Previous cardiovascular history is as outlined above.  For the most part, the patient is done fairly well since last evaluation.  She has baseline dyspnea.  She denies angina.  There is been no PND nor orthopnea.  She relates no significant dysrhythmic activity.  Most recent interrogation of her device indicates normal function with appropriate battery life.  The patient has no specific concerns otherwise and feels that she has done fairly well since last evaluation.    Current Outpatient Medications on File Prior to Visit   Medication Sig Dispense Refill    aspirin 81 MG EC tablet Take 1 tablet by mouth Daily.      butalbital-acetaminophen-caffeine (FIORICET, ESGIC) -40 MG per tablet Take 1 tablet by mouth 3 (Three) Times a Day.      levothyroxine (SYNTHROID, LEVOTHROID) 25 MCG tablet Take 1 tablet by mouth Daily.      Magnesium 400 MG tablet Take 400 mg by mouth 2 (Two) Times a Day.      Multiple Vitamin (MULTIVITAMINS PO) Take  by mouth Daily.      omeprazole (priLOSEC) 40 MG capsule Take 1 capsule by mouth Daily.      propranolol (INDERAL) 20 MG tablet Take 1 tablet by mouth Every 12 (Twelve) Hours.      sertraline (ZOLOFT) 100 MG tablet Take 1 tablet by mouth Daily.      SUMAtriptan (IMITREX) 50 MG tablet Take 2 tablets by mouth As Needed.      traZODone (DESYREL) 50 MG tablet  Take 1 tablet by mouth At Night As Needed.      Xarelto 20 MG tablet Take 1 tablet by mouth Daily. with food.       No current facility-administered medications on file prior to visit.       Gluten meal    Past Medical History:   Diagnosis Date    Anemia     Atherosclerotic heart disease of native coronary artery without angina pectoris     Atrial fibrillation 08/25/2016    Celiac disease     Dilated cardiomyopathy     Hyperlipidemia 08/25/2016    Hyponatremia 03/2024    Hypothyroidism     Sjogren's disease        Social History     Socioeconomic History    Marital status:    Tobacco Use    Smoking status: Never    Smokeless tobacco: Never   Vaping Use    Vaping status: Never Used   Substance and Sexual Activity    Alcohol use: No    Drug use: No    Sexual activity: Defer       Family History   Problem Relation Age of Onset    Other Father         coronary arteriosclerosis    Hypertension Father     Heart disease Father     Other Brother         cardiac pacemaker    Hypertension Mother     Cancer Mother        Review of Systems   Constitutional: Negative.  Negative for chills, diaphoresis, fatigue and fever.   HENT:  Negative for hearing loss.    Eyes: Negative.  Negative for visual disturbance.   Respiratory: Negative.  Negative for apnea, cough, chest tightness, shortness of breath and wheezing.    Cardiovascular: Negative.  Negative for chest pain, palpitations and leg swelling.   Gastrointestinal: Negative.  Negative for abdominal pain and blood in stool.   Endocrine: Negative.    Genitourinary: Negative.  Negative for hematuria.   Musculoskeletal:  Positive for arthralgias. Negative for back pain, myalgias, neck pain and neck stiffness.   Skin: Negative.  Negative for rash and wound.   Allergic/Immunologic: Positive for food allergies (gluten). Negative for environmental allergies.   Neurological:  Positive for headaches (migraines). Negative for dizziness, syncope, weakness, light-headedness and  "numbness.   Hematological:  Bruises/bleeds easily (xarelto).   Psychiatric/Behavioral: Negative.  Negative for sleep disturbance.        Objective   Vitals:    03/27/25 1529   BP: 122/75   Pulse: 70   SpO2: 96%   Weight: 63 kg (139 lb)   Height: 175.3 cm (69\")      /75   Pulse 70   Ht 175.3 cm (69\")   Wt 63 kg (139 lb)   SpO2 96%   BMI 20.53 kg/m²    Lab Results (most recent)       None          Physical Exam  Vitals and nursing note reviewed.   Constitutional:       General: She is not in acute distress.     Appearance: She is well-developed.   HENT:      Head: Normocephalic and atraumatic.   Eyes:      Conjunctiva/sclera: Conjunctivae normal.      Pupils: Pupils are equal, round, and reactive to light.   Neck:      Vascular: No JVD.      Trachea: No tracheal deviation.   Cardiovascular:      Rate and Rhythm: Normal rate and regular rhythm.      Heart sounds: Normal heart sounds.   Pulmonary:      Effort: Pulmonary effort is normal.      Breath sounds: Normal breath sounds.   Abdominal:      General: Bowel sounds are normal. There is no distension.      Palpations: Abdomen is soft. There is no mass.      Tenderness: There is no abdominal tenderness. There is no guarding or rebound.   Musculoskeletal:         General: No tenderness or deformity. Normal range of motion.      Cervical back: Normal range of motion and neck supple.   Skin:     General: Skin is warm and dry.      Coloration: Skin is not pale.      Findings: No erythema or rash.   Neurological:      Mental Status: She is alert and oriented to person, place, and time.   Psychiatric:         Behavior: Behavior normal.         Thought Content: Thought content normal.         Judgment: Judgment normal.           Procedure     ECG 12 Lead    Date/Time: 3/27/2025 3:36 PM  Performed by: Dong Barr PA    Authorized by: Dong Barr PA  Comparison: compared with previous ECG from 1/26/2024  Comparison to previous ECG: Ventricular pacing, no " acute changes noted.             Assessment & Plan      Diagnosis Plan   1. Dilated cardiomyopathy  Adult Transthoracic Echo Complete W/ Cont if Necessary Per Protocol      2. Atrial fibrillation, unspecified type        3. Dyspnea on exertion          1.  The patient presents in the clinic today for routine evaluation and follow-up.  For the most part, the patient is done fairly well.  Given history of dilated cardiomyopathy and a degree of clinical scenario otherwise as above, we will schedule for an echo.  We can evaluate systolic function in particular.  We can evaluate cardiac structure and function otherwise.    2.  Recent device interrogation indicates normal function with adequate battery life.  We will continue to follow that long-term through the clinic.    3.  Laboratories are followed routinely with her primary care provider by her report.  We have requested copies of those.    4.  For now, we will continue medical therapy without change.  She really has done well with this regimen.  We can recommend further, however, as we see results of the above.  As the patient is clinically stable, if echo findings are at baseline for her, we would resume routine follow-ups of the clinic.           Mag Wu  reports that she has never smoked. She has never used smokeless tobacco. Advance Care Planning   ACP discussion was held with the patient during this visit. Patient does not have an advance directive, declines further assistance.         Electronically signed by:

## 2025-04-23 ENCOUNTER — OUTSIDE FACILITY SERVICE (OUTPATIENT)
Dept: CARDIOLOGY | Facility: CLINIC | Age: 80
End: 2025-04-23
Payer: MEDICARE

## 2025-04-25 ENCOUNTER — TELEPHONE (OUTPATIENT)
Dept: CARDIOLOGY | Facility: CLINIC | Age: 80
End: 2025-04-25
Payer: MEDICARE

## 2025-04-25 NOTE — TELEPHONE ENCOUNTER
Received Echo report from [facility] which was reviewed by Dong CORONEL : no changes from previous Echo, moderate MR, moderate AI, long term monitoring.     Patient notified of result's and recommendation's.

## 2025-07-25 ENCOUNTER — OFFICE VISIT (OUTPATIENT)
Dept: CARDIOLOGY | Facility: CLINIC | Age: 80
End: 2025-07-25
Payer: MEDICARE

## 2025-07-25 DIAGNOSIS — I42.8 NICM (NONISCHEMIC CARDIOMYOPATHY): Primary | ICD-10-CM

## 2025-08-04 LAB
MC_CV_MDC_IDC_RATE_1: 180
MC_CV_MDC_IDC_RATE_1: 220
MC_CV_MDC_IDC_SHOCK_MEASURED_IMPEDANCE: 44
MC_CV_MDC_IDC_THERAPIES: NORMAL
MC_CV_MDC_IDC_THERAPIES: NORMAL
MC_CV_MDC_IDC_ZONE_ID: 1
MC_CV_MDC_IDC_ZONE_ID: 2
MDC_IDC_MSMT_BATTERY_REMAINING_LONGEVITY: 120 MO
MDC_IDC_MSMT_BATTERY_REMAINING_PERCENTAGE: 100 %
MDC_IDC_MSMT_BATTERY_STATUS: NORMAL
MDC_IDC_MSMT_CAP_CHARGE_TIME: 9.9
MDC_IDC_MSMT_LEADCHNL_LV_DTM: NORMAL
MDC_IDC_MSMT_LEADCHNL_LV_IMPEDANCE_VALUE: 492
MDC_IDC_MSMT_LEADCHNL_LV_PACING_THRESHOLD_POLARITY: NORMAL
MDC_IDC_MSMT_LEADCHNL_RA_DTM: NORMAL
MDC_IDC_MSMT_LEADCHNL_RA_PACING_THRESHOLD_POLARITY: NORMAL
MDC_IDC_MSMT_LEADCHNL_RV_DTM: NORMAL
MDC_IDC_MSMT_LEADCHNL_RV_IMPEDANCE_VALUE: 359
MDC_IDC_MSMT_LEADCHNL_RV_PACING_THRESHOLD_AMPLITUDE: 0.8
MDC_IDC_MSMT_LEADCHNL_RV_PACING_THRESHOLD_POLARITY: NORMAL
MDC_IDC_MSMT_LEADCHNL_RV_PACING_THRESHOLD_PULSEWIDTH: 0.4
MDC_IDC_PG_IMPLANT_DTM: NORMAL
MDC_IDC_PG_MFG: NORMAL
MDC_IDC_PG_MODEL: NORMAL
MDC_IDC_PG_SERIAL: NORMAL
MDC_IDC_PG_TYPE: NORMAL
MDC_IDC_SESS_DTM: NORMAL
MDC_IDC_SESS_TYPE: NORMAL
MDC_IDC_SET_BRADY_AT_MODE_SWITCH_RATE: 170
MDC_IDC_SET_BRADY_LOWRATE: 70
MDC_IDC_SET_BRADY_MAX_SENSOR_RATE: 130
MDC_IDC_SET_BRADY_MODE: NORMAL
MDC_IDC_SET_CRT_LVRV_DELAY: 20
MDC_IDC_SET_CRT_PACED_CHAMBERS: NORMAL
MDC_IDC_SET_LEADCHNL_LV_PACING_AMPLITUDE: 1.4
MDC_IDC_SET_LEADCHNL_LV_PACING_PULSEWIDTH: 1
MDC_IDC_SET_LEADCHNL_RA_PACING_POLARITY: NORMAL
MDC_IDC_SET_LEADCHNL_RA_SENSING_SENSITIVITY: 0.25
MDC_IDC_SET_LEADCHNL_RV_PACING_AMPLITUDE: 2
MDC_IDC_SET_LEADCHNL_RV_PACING_POLARITY: NORMAL
MDC_IDC_SET_LEADCHNL_RV_PACING_PULSEWIDTH: 0.4
MDC_IDC_SET_LEADCHNL_RV_SENSING_POLARITY: NORMAL
MDC_IDC_SET_LEADCHNL_RV_SENSING_SENSITIVITY: 0.6
MDC_IDC_SET_ZONE_STATUS: NORMAL
MDC_IDC_SET_ZONE_STATUS: NORMAL
MDC_IDC_SET_ZONE_TYPE: NORMAL
MDC_IDC_SET_ZONE_TYPE: NORMAL
MDC_IDC_STAT_BRADY_RA_PERCENT_PACED: 0
MDC_IDC_STAT_BRADY_RV_PERCENT_PACED: 100
MDC_IDC_STAT_CRT_LV_PERCENT_PACED: 100
MDC_IDC_STAT_TACHYTHERAPY_ATP_DELIVERED_RECENT: 0
MDC_IDC_STAT_TACHYTHERAPY_SHOCKS_ABORTED_RECENT: 0
MDC_IDC_STAT_TACHYTHERAPY_SHOCKS_DELIVERED_RECENT: 0